# Patient Record
Sex: FEMALE | Race: WHITE | NOT HISPANIC OR LATINO | Employment: UNEMPLOYED | URBAN - METROPOLITAN AREA
[De-identification: names, ages, dates, MRNs, and addresses within clinical notes are randomized per-mention and may not be internally consistent; named-entity substitution may affect disease eponyms.]

---
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
## 2024-07-26 ENCOUNTER — INITIAL PRENATAL (OUTPATIENT)
Dept: OBSTETRICS AND GYNECOLOGY | Facility: CLINIC | Age: 19
End: 2024-07-26
Payer: COMMERCIAL

## 2024-07-26 VITALS — DIASTOLIC BLOOD PRESSURE: 62 MMHG | SYSTOLIC BLOOD PRESSURE: 108 MMHG | WEIGHT: 196.8 LBS

## 2024-07-26 DIAGNOSIS — Z98.891 PREVIOUS CESAREAN SECTION: ICD-10-CM

## 2024-07-26 DIAGNOSIS — Z36.9 ENCOUNTER FOR ANTENATAL SCREENING: Primary | ICD-10-CM

## 2024-07-26 DIAGNOSIS — Z34.80 SUPERVISION OF OTHER NORMAL PREGNANCY, ANTEPARTUM: ICD-10-CM

## 2024-07-26 PROBLEM — A60.04 HERPES SIMPLEX VULVOVAGINITIS: Status: ACTIVE | Noted: 2024-07-26

## 2024-07-26 RX ORDER — PYRIDOXINE HCL (VITAMIN B6) 50 MG
25 TABLET ORAL DAILY
COMMUNITY
Start: 2024-07-08 | End: 2024-08-07

## 2024-07-26 RX ORDER — ONDANSETRON 4 MG/1
4 TABLET, ORALLY DISINTEGRATING ORAL EVERY 6 HOURS PRN
COMMUNITY
Start: 2024-07-08 | End: 2024-08-07

## 2024-07-26 RX ORDER — BUTALBITAL, ACETAMINOPHEN AND CAFFEINE 300; 40; 50 MG/1; MG/1; MG/1
1 CAPSULE ORAL EVERY 4 HOURS PRN
Qty: 84 CAPSULE | Refills: 1 | Status: CANCELLED | OUTPATIENT
Start: 2024-07-26

## 2024-07-26 RX ORDER — DOXYLAMINE SUCCINATE AND PYRIDOXINE HYDROCHLORIDE, DELAYED RELEASE TABLETS 10 MG/10 MG 10; 10 MG/1; MG/1
TABLET, DELAYED RELEASE ORAL
COMMUNITY
Start: 2024-07-08

## 2024-07-26 RX ORDER — PROMETHAZINE HYDROCHLORIDE 12.5 MG/1
12.5 TABLET ORAL EVERY 6 HOURS PRN
Qty: 30 TABLET | Refills: 3 | Status: SHIPPED | OUTPATIENT
Start: 2024-07-26

## 2024-07-26 NOTE — PROGRESS NOTES
Initial ob visit     CC- Here for care of pregnancy        Hannah Collett is a 18 y.o. female, , who presents for her first obstetrical visit. Patient's last menstrual period was 2024. Her SOTERO is 2025, by Ultrasound. Current GA is 13w4d.     Initial positive test date : 2024, UPT        Her periods are every 27 days.  Prior obstetric issues: HSV+ antibodies and Previous C/S  Patient's past medical history is significant for:  HSV antibodies (never had an outbreak)  Family history of genetic issues (includes FOB): No  Prior infections concerning in pregnancy (Rash, fever in last 2 weeks): No  Varicella Hx - unknown   Prior testing for Cystic Fibrosis Carrier or Sickle Cell Trait- No  Prepregnancy BMI - There is no height or weight on file to calculate BMI.  History of STD: no  Hx of HSV for patient or partner: Patient with HSV+ antibodies  Ultrasound Today: Yes. SIUP measuring 13w4d with FHR: 157bpm.     OB History    Para Term  AB Living   2 1 1 0 0 1   SAB IAB Ectopic Molar Multiple Live Births   0 0 0 0 0 1      # Outcome Date GA Lbr Antione/2nd Weight Sex Type Anes PTL Lv   2 Current            1 Term 21 39w0d  3827 g (8 lb 7 oz) M CS-LTranv Spinal  ARUNA      Birth Comments: [facility]      Obstetric Comments   FOB 1 for G1 and G2       Additional Pertinent History   Last Pap : Patient has never had a pap smear.   Last Completed Pap Smear       This patient has no relevant Health Maintenance data.          History of abnormal Pap smear:  N/A  Family history of uterine, colon, breast, or ovarian cancer: yes - MGM- breast and ovarian CA  Feelings of Anxiety or Depression: no  Tobacco Usage?: Yes Hannah Collett  reports that she has been smoking. She does not have any smokeless tobacco history on file. I have educated her on the risk of diseases from using tobacco products such as cancer, COPD, heart disease, reproductive problems, and low birth weight.      I advised her to quit and she is not willing to quit.    I spent 3  minutes counseling the patient.        Alcohol/Drug Use?: YES Drug: Marijuana- last use was last week  Over the age of 35 at delivery: no  Genetic Screening: desires cell free DNA    PMH    Current Outpatient Medications:     doxylamine (UNISOM) 25 MG tablet, Take 1 tablet by mouth., Disp: , Rfl:     doxylamine-pyridoxine (DICLEGIS) 10-10 MG tablet delayed-release EC tablet, Two tablets at bedtime on days 1 and 2; if symptoms persist, take 1 tablet in morning and 2 tablets at bedtime on day 3; if symptoms persist follow up with OB provider (Patient not taking: Reported on 2024), Disp: , Rfl:     ondansetron ODT (ZOFRAN-ODT) 4 MG disintegrating tablet, Take 1 tablet by mouth Every 6 (Six) Hours As Needed. (Patient not taking: Reported on 2024), Disp: , Rfl:     Prenatal Vit-Fe Fumarate-FA (prenatal vitamin 28-0.8) 28-0.8 MG tablet tablet, Daily. (Patient not taking: Reported on 2024), Disp: , Rfl:     promethazine (PHENERGAN) 12.5 MG tablet, Take 1 tablet by mouth Every 6 (Six) Hours As Needed for Nausea., Disp: 30 tablet, Rfl: 3    pyridoxine 50 MG tablet, Take 0.5 tablets by mouth Daily. (Patient not taking: Reported on 2024), Disp: , Rfl:      Past Medical History:   Diagnosis Date    HSV-2 infection     Ovarian cyst         Past Surgical History:   Procedure Laterality Date     SECTION         Review of Systems   Review of Systems    Patient Reports:  Nausea and Vomiting. Patient reports vomiting every AM. Patient is taking Vitamin B6. Patient was given prescription for Diclegis and Zofran at  [facility] earlier this month. Patient also reports headaches that are not relieved by Tylenol and rest.    Patient Denies:vaginal bleeding  All systems reviewed and otherwise normal.    I have reviewed and agree with the HPI, ROS, and historical information as entered above. Hollis Henning MD      /62   Wt  89.3 kg (196 lb 12.8 oz)   LMP 2024     The additional following portions of the patient's history were reviewed and updated as appropriate: allergies, current medications, past family history, past medical history, past social history, past surgical history, and problem list.    Physical Exam  General:  well developed; well nourished  no acute distress   Chest/Respiratory: No labored breathing, normal respiratory effort, normal appearance, no respiratory noises noted   Heart:  normal rate, regular rhythm,  no murmurs, rubs, or gallops   Thyroid: normal to inspection and palpation   Breasts:  Not performed.   Abdomen: soft, non-tender; no masses  no umbilical or inguinal hernias are present  no hepato-splenomegaly   Pelvis: Not performed.        Assessment and Plan    Problem List Items Addressed This Visit       Previous  section    Supervision of other normal pregnancy, antepartum     Other Visit Diagnoses       Encounter for  screening    -  Primary    Relevant Orders    Obstetric Panel    HIV-1 / O / 2 Ag / Antibody    Urine Culture - Urine, Urine, Clean Catch    Urinalysis With Microscopic - Urine, Clean Catch    Chlamydia trachomatis, Neisseria gonorrhoeae, PCR - Urine, Urine, Clean Catch    Urine Drug Screen - Urine, Clean Catch    Hemoglobin A1c    TSH    Varicella Zoster Antibody, IgG    [id number] PLUS Core+SCA+ESS - Blood,    HSV 1 & 2 - Specific Antibody, IgG            Pregnancy at 13w4d  Reviewed routine prenatal care with the office and educational materials given  Lab(s) Ordered  Discussed options for genetic testing including first trimester nuchal translucency screen, genetic disease carrier testing, quadruple screen, and NIPT  Nausea/Vomiting - desires medication.  Options discussed and encouraged to be proactive to avoid constipation if on Zofran.  Patient is on Prenatal vitamins  Return in about 4 weeks (around 2024) for Routine prenatal care.      Hollis Mederos  MD Milady  07/26/2024

## 2024-07-27 LAB
ABO GROUP BLD: ABNORMAL
AMPHETAMINES UR QL SCN: NEGATIVE NG/ML
APPEARANCE UR: ABNORMAL
BACTERIA #/AREA URNS HPF: NORMAL /[HPF]
BARBITURATES UR QL SCN: NEGATIVE NG/ML
BASOPHILS # BLD AUTO: 0 X10E3/UL (ref 0–0.2)
BASOPHILS NFR BLD AUTO: 0 %
BENZODIAZ UR QL SCN: NEGATIVE NG/ML
BILIRUB UR QL STRIP: NEGATIVE
BLD GP AB SCN SERPL QL: NEGATIVE
BZE UR QL SCN: NEGATIVE NG/ML
CANNABINOIDS UR QL SCN: POSITIVE NG/ML
CASTS URNS QL MICRO: NORMAL /LPF
COLOR UR: YELLOW
CREAT UR-MCNC: 158 MG/DL (ref 20–300)
EOSINOPHIL # BLD AUTO: 0.1 X10E3/UL (ref 0–0.4)
EOSINOPHIL NFR BLD AUTO: 1 %
EPI CELLS #/AREA URNS HPF: NORMAL /HPF (ref 0–10)
ERYTHROCYTE [DISTWIDTH] IN BLOOD BY AUTOMATED COUNT: 12.3 % (ref 11.7–15.4)
GLUCOSE UR QL STRIP: NEGATIVE
HBA1C MFR BLD: 5 % (ref 4.8–5.6)
HBV SURFACE AG SERPL QL IA: NEGATIVE
HCT VFR BLD AUTO: 42.3 % (ref 34–46.6)
HCV IGG SERPL QL IA: NON REACTIVE
HGB BLD-MCNC: 13.8 G/DL (ref 11.1–15.9)
HGB UR QL STRIP: NEGATIVE
HIV 1+2 AB+HIV1 P24 AG SERPL QL IA: NON REACTIVE
HSV1 IGG SER IA-ACNC: 31.3 INDEX (ref 0–0.9)
HSV2 IGG SER IA-ACNC: 5.45 INDEX (ref 0–0.9)
IMM GRANULOCYTES # BLD AUTO: 0 X10E3/UL (ref 0–0.1)
IMM GRANULOCYTES NFR BLD AUTO: 0 %
KETONES UR QL STRIP: NEGATIVE
LABORATORY COMMENT REPORT: ABNORMAL
LEUKOCYTE ESTERASE UR QL STRIP: NEGATIVE
LYMPHOCYTES # BLD AUTO: 2.9 X10E3/UL (ref 0.7–3.1)
LYMPHOCYTES NFR BLD AUTO: 25 %
MCH RBC QN AUTO: 30.1 PG (ref 26.6–33)
MCHC RBC AUTO-ENTMCNC: 32.6 G/DL (ref 31.5–35.7)
MCV RBC AUTO: 92 FL (ref 79–97)
METHADONE UR QL SCN: NEGATIVE NG/ML
MICRO URNS: ABNORMAL
MICRO URNS: ABNORMAL
MONOCYTES # BLD AUTO: 0.7 X10E3/UL (ref 0.1–0.9)
MONOCYTES NFR BLD AUTO: 6 %
NEUTROPHILS # BLD AUTO: 7.8 X10E3/UL (ref 1.4–7)
NEUTROPHILS NFR BLD AUTO: 68 %
NITRITE UR QL STRIP: NEGATIVE
OPIATES UR QL SCN: NEGATIVE NG/ML
OXYCODONE+OXYMORPHONE UR QL SCN: NEGATIVE NG/ML
PCP UR QL: NEGATIVE NG/ML
PH UR STRIP: 8.5 [PH] (ref 5–7.5)
PH UR: 8.8 [PH] (ref 4.5–8.9)
PLATELET # BLD AUTO: 328 X10E3/UL (ref 150–450)
PROPOXYPH UR QL SCN: NEGATIVE NG/ML
PROT UR QL STRIP: ABNORMAL
RBC # BLD AUTO: 4.58 X10E6/UL (ref 3.77–5.28)
RBC #/AREA URNS HPF: NORMAL /HPF (ref 0–2)
RH BLD: POSITIVE
RPR SER QL: NON REACTIVE
RUBV IGG SERPL IA-ACNC: 1.14 INDEX
SP GR UR STRIP: 1.02 (ref 1–1.03)
TSH SERPL DL<=0.005 MIU/L-ACNC: 0.79 UIU/ML (ref 0.45–4.5)
UROBILINOGEN UR STRIP-MCNC: 0.2 MG/DL (ref 0.2–1)
VZV IGG SER IA-ACNC: 239 INDEX
WBC # BLD AUTO: 11.6 X10E3/UL (ref 3.4–10.8)
WBC #/AREA URNS HPF: NORMAL /HPF (ref 0–5)

## 2024-07-28 LAB
BACTERIA UR CULT: NORMAL
BACTERIA UR CULT: NORMAL

## 2024-07-29 LAB
C TRACH RRNA SPEC QL NAA+PROBE: NEGATIVE
N GONORRHOEA RRNA SPEC QL NAA+PROBE: NEGATIVE

## 2024-08-29 ENCOUNTER — ROUTINE PRENATAL (OUTPATIENT)
Dept: OBSTETRICS AND GYNECOLOGY | Facility: CLINIC | Age: 19
End: 2024-08-29
Payer: COMMERCIAL

## 2024-08-29 VITALS — WEIGHT: 198 LBS | SYSTOLIC BLOOD PRESSURE: 118 MMHG | DIASTOLIC BLOOD PRESSURE: 76 MMHG

## 2024-08-29 DIAGNOSIS — Z34.90 PRENATAL CARE, ANTEPARTUM: Primary | ICD-10-CM

## 2024-08-29 DIAGNOSIS — Z3A.18 18 WEEKS GESTATION OF PREGNANCY: ICD-10-CM

## 2024-08-29 DIAGNOSIS — Z34.80 INTRAUTERINE PREGNANCY IN TEENAGER: ICD-10-CM

## 2024-08-29 DIAGNOSIS — Z34.80 SUPERVISION OF OTHER NORMAL PREGNANCY, ANTEPARTUM: ICD-10-CM

## 2024-08-29 LAB
GLUCOSE UR STRIP-MCNC: NEGATIVE MG/DL
PROT UR STRIP-MCNC: NEGATIVE MG/DL

## 2024-08-29 NOTE — PROGRESS NOTES
OB FOLLOW UP  CC- Here for care of pregnancy        Hannah Collett is a 18 y.o.  18w3d patient being seen today for her obstetrical follow up visit. Patient reports no complaints    Her prenatal care is complicated by (and status) : see below.  Patient Active Problem List   Diagnosis    Pregnancy    Herpes simplex vulvovaginitis    Previous  section    Supervision of other normal pregnancy, antepartum    Intrauterine pregnancy in teenager       Flu Status: Declines  Ultrasound Today: No    AFP: is undecided about    ROS -   Patient Denies: leaking of fluid, vaginal bleeding, dysuria, excessive vomiting, and more than 6 contractions per hour  All other systems reviewed and are negative.       The additional following portions of the patient's history were reviewed and updated as appropriate: allergies, current medications, past medical history, past social history, past surgical history, and problem list.      I have reviewed and agree with the HPI, ROS, and historical information as entered above. Ritika Eason, APRN          EXAM:     Prenatal Vitals  BP: 118/76  Weight: 89.8 kg (198 lb)   Fetal Heart Rate: +         Urine Glucose Read-only: Negative  Urine Protein Read-only: Negative           Assessment and Plan    Problem List Items Addressed This Visit          Gravid and     Pregnancy    Supervision of other normal pregnancy, antepartum    Intrauterine pregnancy in teenager     Other Visit Diagnoses       Prenatal care, antepartum    -  Primary    Relevant Orders    POC Urinalysis Dipstick (Completed)    US Ob 14 + Weeks Single or First Gestation            Pregnancy at 18w3d  Fetal status reassuring.   Counseled on MSAFP alone in relation to OTD and placental issues.    Anatomy scan next visit.   Activity and Exercise discussed.  Patient is on Prenatal vitamins  Return in about 2 weeks (around 2024) for anatomy US.    Ritika Eason, APRN  2024

## 2024-09-11 ENCOUNTER — REFERRAL TRIAGE (OUTPATIENT)
Dept: LABOR AND DELIVERY | Facility: HOSPITAL | Age: 19
End: 2024-09-11
Payer: COMMERCIAL

## 2024-09-19 ENCOUNTER — PATIENT OUTREACH (OUTPATIENT)
Dept: LABOR AND DELIVERY | Facility: HOSPITAL | Age: 19
End: 2024-09-19
Payer: COMMERCIAL

## 2024-10-01 ENCOUNTER — ROUTINE PRENATAL (OUTPATIENT)
Dept: OBSTETRICS AND GYNECOLOGY | Facility: CLINIC | Age: 19
End: 2024-10-01
Payer: COMMERCIAL

## 2024-10-01 VITALS — WEIGHT: 214.4 LBS | SYSTOLIC BLOOD PRESSURE: 116 MMHG | DIASTOLIC BLOOD PRESSURE: 74 MMHG

## 2024-10-01 DIAGNOSIS — Z3A.23 23 WEEKS GESTATION OF PREGNANCY: Primary | ICD-10-CM

## 2024-10-01 DIAGNOSIS — Z98.891 PREVIOUS CESAREAN SECTION: ICD-10-CM

## 2024-10-01 PROCEDURE — 99213 OFFICE O/P EST LOW 20 MIN: CPT | Performed by: OBSTETRICS & GYNECOLOGY

## 2024-10-01 NOTE — PROGRESS NOTES
OB FOLLOW UP  CC- Here for care of pregnancy        Hannah Collett is a 19 y.o.  23w1d patient being seen today for her obstetrical follow up visit. Patient reports no complaints.     Her prenatal care is complicated by (and status) : see below.  Patient Active Problem List   Diagnosis    Pregnancy    Herpes simplex vulvovaginitis    Previous  section    Supervision of other normal pregnancy, antepartum    Intrauterine pregnancy in teenager       Ultrasound Today: Yes  Reviewed 1 hr glucose testing and TDAP next visit.    ROS -   Patient Denies: leaking of fluid, vaginal bleeding, dysuria, excessive vomiting, and more than 6 contractions per hour  Fetal Movement : normal  All other systems reviewed and are negative.       The additional following portions of the patient's history were reviewed and updated as appropriate: allergies, current medications, past family history, past medical history, past social history, past surgical history, and problem list.      I have reviewed and agree with the HPI, ROS, and historical information as entered above. Me      /74   Wt 97.3 kg (214 lb 6.4 oz)   LMP 2024       EXAM:     Prenatal Vitals  BP: 116/74  Weight: 97.3 kg (214 lb 6.4 oz)   Fetal Heart Rate: 154/US                       Assessment and Plan    Problem List Items Addressed This Visit       Pregnancy - Primary    Relevant Orders    [facility]    Previous  section       Pregnancy at 23w1d  Fetal status reassuring.  anatomy scan incomplete.  1 hour gtt, CBC, Antibody screen, TDAP, and RPR next visit. Instructions given  Fetal movement/PTL or Labor precautions  Reviewed routine prenatal care with the office and educational materials given  Referral to [facility] Ordered  Discussed/encouraged TDAP vaccination after 28 weeks  Reviewed Pre-eclampsia signs/symptoms  Activity and Exercise discussed.  Return in about 4 weeks (around 10/29/2024) for Routine prenatal  care after [facility] scan. [facility] referral placed to complete anatomy scan.      Hollis Henning MD  10/01/2024

## 2024-10-29 ENCOUNTER — HOSPITAL ENCOUNTER (OUTPATIENT)
Dept: WOMENS IMAGING | Facility: HOSPITAL | Age: 19
Discharge: HOME | End: 2024-10-29
Admitting: OBSTETRICS & GYNECOLOGY
Payer: COMMERCIAL

## 2024-10-29 ENCOUNTER — ROUTINE PRENATAL (OUTPATIENT)
Dept: OBSTETRICS AND GYNECOLOGY | Facility: CLINIC | Age: 19
End: 2024-10-29
Payer: COMMERCIAL

## 2024-10-29 ENCOUNTER — OFFICE VISIT (OUTPATIENT)
Dept: OBSTETRICS AND GYNECOLOGY | Facility: HOSPITAL | Age: 19
End: 2024-10-29
Payer: COMMERCIAL

## 2024-10-29 VITALS — DIASTOLIC BLOOD PRESSURE: 71 MMHG | SYSTOLIC BLOOD PRESSURE: 122 MMHG | WEIGHT: 219 LBS

## 2024-10-29 VITALS — SYSTOLIC BLOOD PRESSURE: 122 MMHG | WEIGHT: 220.6 LBS | DIASTOLIC BLOOD PRESSURE: 82 MMHG

## 2024-10-29 DIAGNOSIS — Z3A.27 27 WEEKS GESTATION OF PREGNANCY: Primary | ICD-10-CM

## 2024-10-29 DIAGNOSIS — Z98.891 PREVIOUS CESAREAN SECTION: Primary | ICD-10-CM

## 2024-10-29 DIAGNOSIS — Z3A.23 23 WEEKS GESTATION OF PREGNANCY: ICD-10-CM

## 2024-10-29 DIAGNOSIS — Z98.891 PREVIOUS CESAREAN SECTION: ICD-10-CM

## 2024-10-29 DIAGNOSIS — F17.200 SMOKER: ICD-10-CM

## 2024-10-29 DIAGNOSIS — Z3A.27 27 WEEKS GESTATION OF PREGNANCY: ICD-10-CM

## 2024-10-29 PROBLEM — B00.1 FEVER BLISTER: Status: ACTIVE | Noted: 2024-07-26

## 2024-10-29 PROCEDURE — 76811 OB US DETAILED SNGL FETUS: CPT

## 2024-10-29 NOTE — PROGRESS NOTES
Patient denies any leaking fluid, bleeding, or contractions. Patient states having severe back pain, rating a 10 on pain scale, occurring occasionally and pain lasts 5 minutes.   NIPT negative  Patient states follow up with Dr. Henning's office is today.

## 2024-10-29 NOTE — PROGRESS NOTES
OB FOLLOW UP  CC- Here for care of pregnancy        Hannah Collett is a 19 y.o.  27w1d patient being seen today for her obstetrical follow up. Patient reports no complaints.     Patient undergoing Glucola testing today. She is due for her testing at 12:00.       MBT: O+  Rhogam: is not indicated.  28 week packet: reviewed with patient , counseled on fetal movement , pediatrician list reviewed, breast pump discussed, and childbirth classes reviewed  TDAP: declines  Flu Status: Declines  Ultrasound Today: Yes, at [facility]     Her prenatal care is complicated by (and status) : see below.  Patient Active Problem List   Diagnosis    Pregnancy    Fever blister    Previous  section    Supervision of other normal pregnancy, antepartum    Intrauterine pregnancy in teenager    Smoker         ROS -   Patient Denies: Loss of Fluid, Vaginal Spotting, Vision Changes, Headaches, Nausea , Vomiting , Contractions, Epigastric pain, and skin itching  Fetal Movement : normal    The additional following portions of the patient's history were reviewed and updated as appropriate: allergies, current medications, past family history, past medical history, past social history, past surgical history, and problem list.    I have reviewed and agree with the HPI, ROS, and historical information as entered above. Hollis Henning MD      /82   Wt 100 kg (220 lb 9.6 oz)   LMP 2024         EXAM:     Prenatal Vitals  BP: 122/82  Weight: 100 kg (220 lb 9.6 oz)                             Assessment and Plan    Problem List Items Addressed This Visit       Pregnancy - Primary    Relevant Orders    CBC (No Diff)    Gestational Screen 1 Hr ([facility])    Antibody Screen    RPR, Rfx Qn RPR / Confirm TP    Previous  section       Pregnancy at 27w1d  1 hr Glucola, CBC, RPR. Antibody screen and TDAP today  Fetal movement/PTL or Labor precautions  Lab(s) Ordered  Patient is on Prenatal vitamins  Reviewed Pre-eclampsia  signs/symptoms  Activity and Exercise discussed.  Return in about 2 weeks (around 11/12/2024) for Routine prenatal care.        Hollis Henning MD  10/29/2024

## 2024-10-29 NOTE — PROGRESS NOTES
Patient seen in Maternal Fetal Medicine clinic today. Please see full note in under imaging tab of patient chart in Epic (Viewpoint report).    Hannah Kahn MD

## 2024-10-30 LAB
BLD GP AB SCN SERPL QL: NEGATIVE
ERYTHROCYTE [DISTWIDTH] IN BLOOD BY AUTOMATED COUNT: 12 % (ref 12.3–15.4)
GLUCOSE 1H P 50 G GLC PO SERPL-MCNC: 109 MG/DL (ref 65–139)
HCT VFR BLD AUTO: 37.5 % (ref 34–46.6)
HGB BLD-MCNC: 12.3 G/DL (ref 12–15.9)
MCH RBC QN AUTO: 30.4 PG (ref 26.6–33)
MCHC RBC AUTO-ENTMCNC: 32.8 G/DL (ref 31.5–35.7)
MCV RBC AUTO: 92.6 FL (ref 79–97)
PLATELET # BLD AUTO: 347 10*3/MM3 (ref 140–450)
RBC # BLD AUTO: 4.05 10*6/MM3 (ref 3.77–5.28)
RPR SER QL: NON REACTIVE
WBC # BLD AUTO: 18.43 10*3/MM3 (ref 3.4–10.8)

## 2024-11-07 ENCOUNTER — PATIENT OUTREACH (OUTPATIENT)
Dept: LABOR AND DELIVERY | Facility: HOSPITAL | Age: 19
End: 2024-11-07
Payer: COMMERCIAL

## 2024-11-07 NOTE — OUTREACH NOTE
[facility]  Unable to Reach    Questions/Answers      Flowsheet Row Responses   Pending Outreach Prenatal Check-in   Call Attempt First   Outcome Missing or invalid number   Unable to reach comments: Calls not going through              CHARLIE Ramírez RN  Maternity Nurse Navigator    11/7/2024, 11:40 EST

## 2024-11-11 ENCOUNTER — PATIENT OUTREACH (OUTPATIENT)
Dept: LABOR AND DELIVERY | Facility: HOSPITAL | Age: 19
End: 2024-11-11
Payer: COMMERCIAL

## 2024-11-11 NOTE — OUTREACH NOTE
[facility]  Unable to Reach    Questions/Answers      Flowsheet Row Responses   Pending Outreach Prenatal Check-in   Call Attempt Second   Outcome Missing or invalid number   Next Call Attempt Date 11/12/24   Unable to reach comments: Call not going through. Will see in office tomorrow.            CHARLIE Ramírez RN  Maternity Nurse Navigator    11/11/2024, 13:26 EST

## 2024-11-12 ENCOUNTER — TELEPHONE (OUTPATIENT)
Dept: OBSTETRICS AND GYNECOLOGY | Facility: CLINIC | Age: 19
End: 2024-11-12

## 2024-11-12 NOTE — TELEPHONE ENCOUNTER
Caller: Collett, Hannah    Relationship:  Self    Best call back number: [phone]    PATIENT CALLED REQUESTING TO CANCEL SAME DAY APPT.    Did the patient call AFTER the start time of their scheduled appointment?  []YES  [x]NO    Was the patient's appointment rescheduled? [x]YES  []NO    Any additional information:

## 2024-11-13 ENCOUNTER — ROUTINE PRENATAL (OUTPATIENT)
Dept: OBSTETRICS AND GYNECOLOGY | Facility: CLINIC | Age: 19
End: 2024-11-13
Payer: COMMERCIAL

## 2024-11-13 ENCOUNTER — PATIENT OUTREACH (OUTPATIENT)
Dept: LABOR AND DELIVERY | Facility: HOSPITAL | Age: 19
End: 2024-11-13
Payer: COMMERCIAL

## 2024-11-13 VITALS — DIASTOLIC BLOOD PRESSURE: 78 MMHG | SYSTOLIC BLOOD PRESSURE: 118 MMHG | WEIGHT: 221.6 LBS

## 2024-11-13 DIAGNOSIS — Z98.891 PREVIOUS CESAREAN SECTION: ICD-10-CM

## 2024-11-13 DIAGNOSIS — Z3A.29 29 WEEKS GESTATION OF PREGNANCY: Primary | ICD-10-CM

## 2024-11-13 LAB
GLUCOSE UR STRIP-MCNC: NEGATIVE MG/DL
PROT UR STRIP-MCNC: NEGATIVE MG/DL

## 2024-11-13 NOTE — OUTREACH NOTE
"[facility]  Check-In    Current Estimated Gestational Age: 29w2d      Questions/Answers      Flowsheet Row Responses   Best Method for Contacting emere  [currently out of minutes on phone until beginning of month]   Demographics Reviewed Yes   Currently Employed No   Able to keep appointments as scheduled Yes   Gender(s) and Name(s) Girl \"Gail\"   Baby Active/Feeling Fetal Movemen Yes   How are you presently feeling? Good   Are you having any of the following symptoms? --  [Denies]   Questions regarding prenatal visits or tests to be ordered? No   Education related to new diagnoses/home equipment No   Supplies ready for baby Clothing, Crib   Resource/Environmental Concerns Financial, Reliable Transportation   Do you have any questions related to your care experience, your pregnancy, plans for delivery, any concerns, etc? No   Other Education WIC Benefits, Birth Plan, Other   Other Education Comment Fetal Kick Counts            Feeling well and reports good fetal movement. Denies any concerning symptoms but discussed what to watch out for. States she has an appointment for WIC today. Reviewed fetal kick counts and provided with printed version of Count the Kicks for tracking purposes. Discussed breastfeeding video and outpatient lactation clinic support as well as WIC support. She has begun gathering items she will need for baby.    Updated resources provided via emere message. Contact information provided. Encouraged to call with questions, concerns, or for support. Will plan f/u around 35 weeks to ensure she has everything she needs in place and feels ready for delivery.    CHARLIE Ramírez RN  Maternity Nurse Navigator    11/13/2024, 11:40 EST          "

## 2024-11-13 NOTE — PROGRESS NOTES
OB FOLLOW UP  CC- Here for care of pregnancy        Hannah Collett is a 19 y.o.  29w2d patient being seen today for her obstetrical follow up visit. Patient reports lower back pain that is intermittent.     Her prenatal care is complicated by (and status) :  see below.  Patient Active Problem List   Diagnosis    Pregnancy    Fever blister    Previous  section    Supervision of other normal pregnancy, antepartum    Intrauterine pregnancy in teenager    Smoker       Flu Status: Declines  TDAP status: declines  Rhogam status: was not indicated  28 week labs: Reviewed and normal  Ultrasound Today: No  Non Stress Test: No.      ROS -   Patient Denies: Loss of Fluid, Vaginal Spotting, Vision Changes, Headaches, Nausea , Vomiting , Contractions, Epigastric pain, and skin itching  Fetal Movement : normal  All other systems reviewed and are negative.       The additional following portions of the patient's history were reviewed and updated as appropriate: allergies, current medications, past family history, past medical history, past social history, past surgical history, and problem list.    I have reviewed and agree with the HPI, ROS, and historical information as entered above. Hollis Henning MD      /78   Wt 101 kg (221 lb 9.6 oz)   LMP 2024         EXAM:     Prenatal Vitals  BP: 118/78  Weight: 101 kg (221 lb 9.6 oz)                   Urine Glucose Read-only: Negative  Urine Protein Read-only: Negative           Assessment and Plan    Problem List Items Addressed This Visit       Pregnancy - Primary    Relevant Orders    POC Urinalysis Dipstick (Completed)    Previous  section     Other Visit Diagnoses       Maternal care for breech presentation, single gestation                Pregnancy at 29w2d  Fetal status reassuring.  28 week labs reviewed.    Activity and Exercise discussed.  Fetal movement/PTL or Labor precautions  Patient is on Prenatal vitamins  Reviewed  Pre-eclampsia signs/symptoms  Return in about 2 weeks (around 11/27/2024) for Routine prenatal care.    Hollis Henning MD  11/13/2024

## 2024-12-04 ENCOUNTER — ROUTINE PRENATAL (OUTPATIENT)
Dept: OBSTETRICS AND GYNECOLOGY | Facility: CLINIC | Age: 19
End: 2024-12-04
Payer: COMMERCIAL

## 2024-12-04 VITALS — SYSTOLIC BLOOD PRESSURE: 110 MMHG | DIASTOLIC BLOOD PRESSURE: 68 MMHG | WEIGHT: 230 LBS

## 2024-12-04 DIAGNOSIS — A60.00 GENITAL HERPES SIMPLEX, UNSPECIFIED SITE: ICD-10-CM

## 2024-12-04 DIAGNOSIS — Z34.83 MULTIGRAVIDA IN THIRD TRIMESTER: Primary | ICD-10-CM

## 2024-12-04 DIAGNOSIS — Z98.891 PREVIOUS CESAREAN SECTION: ICD-10-CM

## 2024-12-04 LAB
GLUCOSE UR STRIP-MCNC: NEGATIVE MG/DL
PROT UR STRIP-MCNC: NEGATIVE MG/DL

## 2024-12-04 RX ORDER — VALACYCLOVIR HYDROCHLORIDE 1 G/1
1000 TABLET, FILM COATED ORAL DAILY
Qty: 30 TABLET | Refills: 12 | Status: SHIPPED | OUTPATIENT
Start: 2024-12-04

## 2024-12-04 NOTE — PROGRESS NOTES
OB FOLLOW UP  CC- Here for care of pregnancy        Hannah Collett is a 19 y.o.  32w2d patient being seen today for her obstetrical follow up visit. Patient reports skin itching. She reports itching in feet since last night. Also reports occasional BHCTX and increased vaginal pressure.     Her prenatal care is complicated by (and status) :  see below.  Patient Active Problem List   Diagnosis    Pregnancy    Fever blister    Previous  section    Supervision of other normal pregnancy, antepartum    Intrauterine pregnancy in teenager    Smoker    Genital herpes       Flu Status: Declines  TDAP status: declines  RSV vaccine: given today.  Rhogam status: was not indicated  28 week labs: Reviewed and normal  Ultrasound Today: No  Non Stress Test: No.      ROS -   Patient Denies: Loss of Fluid, Vaginal Spotting, Vision Changes, Headaches, Nausea , Vomiting , and Epigastric pain  Fetal Movement : normal  All other systems reviewed and are negative.       The additional following portions of the patient's history were reviewed and updated as appropriate: allergies, current medications, and problem list.    I have reviewed and agree with the HPI, ROS, and historical information as entered above. Hollis Henning MD      /68   Wt 104 kg (230 lb)   LMP 2024         EXAM:     Prenatal Vitals  BP: 110/68  Weight: 104 kg (230 lb)                   Urine Glucose Read-only: Negative  Urine Protein Read-only: Negative           Assessment and Plan    Problem List Items Addressed This Visit       Previous  section    Genital herpes    Relevant Medications    valACYclovir (Valtrex) 1000 MG tablet     Other Visit Diagnoses       Multigravida in third trimester    -  Primary    Relevant Orders    POC Urinalysis Dipstick (Completed)    ABRYSVO RSV Vaccine (Adults 60+, pregnant women 32-36 wks) (Completed)            Pregnancy at 32w2d  Fetal status reassuring.  28 week labs reviewed.     Activity and Exercise discussed.  Fetal movement/PTL or Labor precautions  Medication(s) Ordered  Patient is on Prenatal vitamins  Reviewed Pre-eclampsia signs/symptoms  Will start on Valacyclovir for HSV suppression  Return in about 2 weeks (around 12/18/2024) for Routine prenatal care.    Hollis Henning MD  12/04/2024

## 2024-12-23 ENCOUNTER — ROUTINE PRENATAL (OUTPATIENT)
Dept: OBSTETRICS AND GYNECOLOGY | Facility: CLINIC | Age: 19
End: 2024-12-23
Payer: COMMERCIAL

## 2024-12-23 ENCOUNTER — PATIENT OUTREACH (OUTPATIENT)
Dept: LABOR AND DELIVERY | Facility: HOSPITAL | Age: 19
End: 2024-12-23
Payer: COMMERCIAL

## 2024-12-23 VITALS — SYSTOLIC BLOOD PRESSURE: 108 MMHG | DIASTOLIC BLOOD PRESSURE: 78 MMHG | WEIGHT: 233 LBS

## 2024-12-23 DIAGNOSIS — O98.819 CHLAMYDIA INFECTION DURING PREGNANCY: ICD-10-CM

## 2024-12-23 DIAGNOSIS — A74.9 CHLAMYDIA INFECTION DURING PREGNANCY: ICD-10-CM

## 2024-12-23 DIAGNOSIS — Z3A.35 35 WEEKS GESTATION OF PREGNANCY: Primary | ICD-10-CM

## 2024-12-23 LAB
GLUCOSE UR STRIP-MCNC: NEGATIVE MG/DL
PROT UR STRIP-MCNC: NEGATIVE MG/DL

## 2024-12-23 RX ORDER — AZITHROMYCIN 500 MG/1
1000 TABLET, FILM COATED ORAL ONCE
Qty: 2 TABLET | Refills: 0 | Status: SHIPPED | OUTPATIENT
Start: 2024-12-23 | End: 2024-12-23

## 2024-12-23 NOTE — OUTREACH NOTE
[facility]  Unable to Reach    Questions/Answers      Flowsheet Row Responses   Pending Outreach Prenatal Check-in   Call Attempt First   Outcome No answer/busy   Next Call Attempt Date 01/06/25              CHARLIE Ramírez RN  Maternity Nurse Navigator    12/23/2024, 13:10 EST

## 2024-12-23 NOTE — PROGRESS NOTES
OB FOLLOW UP  CC- Here for care of pregnancy        Hannah Collett is a 19 y.o.  35w0d patient being seen today for her obstetrical follow up visit. Patient reports positive chlamydia at  2024.   Discussed treatment and will send in Rx.   Also discussed repeat CS and bilateral salpingectomy. Risks of surgery explained and all  questions answered.     Her prenatal care is complicated by (and status) : see below.  Patient Active Problem List   Diagnosis    Pregnancy    Fever blister    Previous  section    Supervision of other normal pregnancy, antepartum    Intrauterine pregnancy in teenager    Smoker    Genital herpes       Flu Status: Declines  RSV: already given  Ultrasound Today: No  Non Stress Test: No.    ROS -   Patient Denies: Loss of Fluid, Vaginal Spotting, Vision Changes, Headaches, Nausea , Vomiting , Epigastric pain, and skin itching  Fetal Movement : normal  All other systems reviewed and are negative.       The additional following portions of the patient's history were reviewed and updated as appropriate: allergies and current medications.    I have reviewed and agree with the HPI, ROS, and historical information as entered above. Hollis Henning MD      /78   Wt 106 kg (233 lb)   LMP 2024       EXAM:     Prenatal Vitals  BP: 108/78  Weight: 106 kg (233 lb)   Fetal Heart Rate: 150      Fundal Height (cm): 35 cm        Urine Glucose Read-only: Negative  Urine Protein Read-only: Negative           Assessment and Plan    Problem List Items Addressed This Visit       Pregnancy - Primary    Relevant Orders    POC Urinalysis Dipstick (Completed)     Other Visit Diagnoses       Chlamydia infection during pregnancy        Relevant Medications    azithromycin (ZITHROMAX) 500 MG tablet            Pregnancy at 35w0d  Fetal status reassuring.   Activity and Exercise discussed.  Fetal movement/PTL or Labor precautions  Patient is on Prenatal vitamins  Reviewed  Pre-eclampsia signs/symptoms  GBS next visit  Discussed repeat CS along with bilateral salpingectomy for sterilization and all consents signed. Will set up at 39 weeks.   Will streat chlamydia with azithromycin.   Return in about 1 week (around 12/30/2024) for Routine prenatal care in [place] with me.    Hollis Henning MD  12/23/2024

## 2024-12-31 ENCOUNTER — LAB (OUTPATIENT)
Dept: LAB | Facility: HOSPITAL | Age: 19
End: 2024-12-31
Payer: COMMERCIAL

## 2024-12-31 ENCOUNTER — ROUTINE PRENATAL (OUTPATIENT)
Dept: OBSTETRICS AND GYNECOLOGY | Facility: CLINIC | Age: 19
End: 2024-12-31
Payer: COMMERCIAL

## 2024-12-31 VITALS — SYSTOLIC BLOOD PRESSURE: 124 MMHG | DIASTOLIC BLOOD PRESSURE: 76 MMHG | WEIGHT: 231.6 LBS

## 2024-12-31 DIAGNOSIS — Z98.891 PREVIOUS CESAREAN SECTION: ICD-10-CM

## 2024-12-31 DIAGNOSIS — B00.1 COLD SORE: ICD-10-CM

## 2024-12-31 DIAGNOSIS — Z3A.36 36 WEEKS GESTATION OF PREGNANCY: Primary | ICD-10-CM

## 2024-12-31 PROCEDURE — 87081 CULTURE SCREEN ONLY: CPT | Performed by: OBSTETRICS & GYNECOLOGY

## 2024-12-31 RX ORDER — VALACYCLOVIR HYDROCHLORIDE 1 G/1
1000 TABLET, FILM COATED ORAL 2 TIMES DAILY
Qty: 20 TABLET | Refills: 3 | Status: SHIPPED | OUTPATIENT
Start: 2024-12-31

## 2024-12-31 NOTE — PROGRESS NOTES
OB FOLLOW UP  CC- Here for care of pregnancy        Hannah Collett is a 19 y.o.  36w1d patient being seen today for her obstetrical follow up visit. Patient reports fatigue and mild edema in hands and feet that is intermittent. She complains of cold sore and desires treatment.     Her prenatal care is complicated by (and status) : see below.  Patient Active Problem List   Diagnosis    Pregnancy    Fever blister    Previous  section    Supervision of other normal pregnancy, antepartum    Intrauterine pregnancy in teenager    Smoker    Genital herpes    Cold sore       GBS Status: Done Today. She is not allergic to PCN.    No Known Allergies       Her Delivery Plan is:  R C/S with salpingectomy on 2025 @ 7:30 AM     US today: no  Non Stress Test: No.    ROS -   Patient Denies: Loss of Fluid, Vaginal Spotting, Vision Changes, Headaches, Nausea , Vomiting , Contractions, Epigastric pain, and skin itching  Fetal Movement : normal  All other systems reviewed and are negative.       The additional following portions of the patient's history were reviewed and updated as appropriate: allergies, current medications, past family history, past medical history, past social history, past surgical history, and problem list.    I have reviewed and agree with the HPI, ROS, and historical information as entered above. Hollis Henning MD        EXAM:     Prenatal Vitals  BP: 124/76  Weight: 105 kg (231 lb 9.6 oz)   Fetal Heart Rate: 140   Fundal Height (cm): 36 cm   Dilation/Effacement/Station  Dilation: Closed                  Assessment and Plan    Problem List Items Addressed This Visit       Pregnancy - Primary    Relevant Orders    Group B Streptococcus Culture - Swab, Vaginal/Rectum    Previous  section    Cold sore    Relevant Medications    valACYclovir (Valtrex) 1000 MG tablet    valACYclovir (Valtrex) 1000 MG tablet       Pregnancy at 36w1d  Fetal status reassuring.   Lab(s)  Ordered  Reviewed Pre-eclampsia signs/symptoms  Reviewed upcoming c/s and PAT instructions with patient. Arrival time and NPO status reviewed.   Rx for valacyclovir sent  Delivery options reviewed with patient  Signs of labor reviewed  Kick counts reviewed  Activity and Exercise discussed.  Return in about 1 week (around 1/7/2025) for Routine prenatal care.    Hollis Henning MD  12/31/2024

## 2025-01-03 LAB — BACTERIA SPEC AEROBE CULT: NORMAL

## 2025-01-07 ENCOUNTER — TELEPHONE (OUTPATIENT)
Dept: OBSTETRICS AND GYNECOLOGY | Facility: CLINIC | Age: 20
End: 2025-01-07

## 2025-01-07 NOTE — TELEPHONE ENCOUNTER
left voicemail to let her know she can come this afternoon or tomorrow for her rescheduled appt (due to weather delay) and she can call back to schedule     (Per Dr. Henning)

## 2025-01-08 ENCOUNTER — PATIENT OUTREACH (OUTPATIENT)
Dept: LABOR AND DELIVERY | Facility: HOSPITAL | Age: 20
End: 2025-01-08
Payer: COMMERCIAL

## 2025-01-08 NOTE — OUTREACH NOTE
[facility]  Unable to Reach    Questions/Answers      Flowsheet Row Responses   Pending Outreach Prenatal Check-in   Call Attempt Second   Outcome No answer/busy, Left message   Next Call Attempt Date 01/16/25            Requested she let me know when a good time to talk would be. Contact information provided.    CHARLIE Ramírez RN  Maternity Nurse Navigator    1/8/2025, 10:53 EST

## 2025-01-15 ENCOUNTER — PATIENT OUTREACH (OUTPATIENT)
Dept: LABOR AND DELIVERY | Facility: HOSPITAL | Age: 20
End: 2025-01-15
Payer: COMMERCIAL

## 2025-01-15 NOTE — OUTREACH NOTE
[facility]  Unable to Reach    Questions/Answers      Flowsheet Row Responses   Pending Outreach Prenatal Check-in   Call Attempt Third   Outcome No answer/busy, Left message, MyChart message sent to patient   Unable to reach comments: Will see inpatient after delivery              CHARLIE Ramírez RN  Maternity Nurse Navigator    1/15/2025, 16:35 EST

## 2025-01-20 ENCOUNTER — TELEPHONE (OUTPATIENT)
Dept: OBSTETRICS AND GYNECOLOGY | Facility: CLINIC | Age: 20
End: 2025-01-20
Payer: COMMERCIAL

## 2025-01-20 ENCOUNTER — PREP FOR SURGERY (OUTPATIENT)
Dept: OTHER | Facility: HOSPITAL | Age: 20
End: 2025-01-20
Payer: COMMERCIAL

## 2025-01-20 DIAGNOSIS — Z98.891 PREVIOUS CESAREAN SECTION: Primary | ICD-10-CM

## 2025-01-20 RX ORDER — SODIUM CHLORIDE 0.9 % (FLUSH) 0.9 %
10 SYRINGE (ML) INJECTION EVERY 12 HOURS SCHEDULED
Status: CANCELLED | OUTPATIENT
Start: 2025-01-20

## 2025-01-20 RX ORDER — KETOROLAC TROMETHAMINE 30 MG/ML
30 INJECTION, SOLUTION INTRAMUSCULAR; INTRAVENOUS ONCE
Status: CANCELLED | OUTPATIENT
Start: 2025-01-20 | End: 2025-01-20

## 2025-01-20 RX ORDER — SODIUM CHLORIDE 9 MG/ML
40 INJECTION, SOLUTION INTRAVENOUS AS NEEDED
Status: CANCELLED | OUTPATIENT
Start: 2025-01-20

## 2025-01-20 RX ORDER — OXYTOCIN/0.9 % SODIUM CHLORIDE 30/500 ML
650 PLASTIC BAG, INJECTION (ML) INTRAVENOUS ONCE
Status: CANCELLED | OUTPATIENT
Start: 2025-01-20 | End: 2025-01-20

## 2025-01-20 RX ORDER — MISOPROSTOL 200 UG/1
800 TABLET ORAL AS NEEDED
Status: CANCELLED | OUTPATIENT
Start: 2025-01-20

## 2025-01-20 RX ORDER — METHYLERGONOVINE MALEATE 0.2 MG/ML
200 INJECTION INTRAVENOUS ONCE AS NEEDED
Status: CANCELLED | OUTPATIENT
Start: 2025-01-20

## 2025-01-20 RX ORDER — SODIUM CHLORIDE, SODIUM LACTATE, POTASSIUM CHLORIDE, CALCIUM CHLORIDE 600; 310; 30; 20 MG/100ML; MG/100ML; MG/100ML; MG/100ML
125 INJECTION, SOLUTION INTRAVENOUS CONTINUOUS
Status: CANCELLED | OUTPATIENT
Start: 2025-01-20 | End: 2025-01-20

## 2025-01-20 RX ORDER — ACETAMINOPHEN 500 MG
1000 TABLET ORAL ONCE
Status: CANCELLED | OUTPATIENT
Start: 2025-01-20 | End: 2025-01-20

## 2025-01-20 RX ORDER — SODIUM CHLORIDE 0.9 % (FLUSH) 0.9 %
10 SYRINGE (ML) INJECTION AS NEEDED
Status: CANCELLED | OUTPATIENT
Start: 2025-01-20

## 2025-01-20 RX ORDER — BUPIVACAINE HCL/0.9 % NACL/PF 0.1 %
2000 PLASTIC BAG, INJECTION (ML) EPIDURAL ONCE
Status: CANCELLED | OUTPATIENT
Start: 2025-01-20 | End: 2025-01-20

## 2025-01-20 RX ORDER — LIDOCAINE HYDROCHLORIDE 10 MG/ML
0.5 INJECTION, SOLUTION EPIDURAL; INFILTRATION; INTRACAUDAL; PERINEURAL ONCE AS NEEDED
Status: CANCELLED | OUTPATIENT
Start: 2025-01-20

## 2025-01-20 RX ORDER — CITRIC ACID/SODIUM CITRATE 334-500MG
30 SOLUTION, ORAL ORAL ONCE
Status: CANCELLED | OUTPATIENT
Start: 2025-01-20 | End: 2025-01-20

## 2025-01-20 RX ORDER — CARBOPROST TROMETHAMINE 250 UG/ML
250 INJECTION, SOLUTION INTRAMUSCULAR AS NEEDED
Status: CANCELLED | OUTPATIENT
Start: 2025-01-20

## 2025-01-20 RX ORDER — OXYTOCIN/0.9 % SODIUM CHLORIDE 30/500 ML
85 PLASTIC BAG, INJECTION (ML) INTRAVENOUS ONCE
Status: CANCELLED | OUTPATIENT
Start: 2025-01-20 | End: 2025-01-20

## 2025-01-20 NOTE — TELEPHONE ENCOUNTER
Patient of Dr. Henning;  @ 39w 0d. Patient is scheduled for [facility] tomorrow @ 0730.  Returned patient's call.   States she did not have transportation to get to PAT appointment today but will have transportation tomorrow.  Instructed her to arrive at L&D between 5-5:30 am tomorrow; NPO after midnight tonight.   Patient v/u and agreed. Informed L&D.

## 2025-01-20 NOTE — TELEPHONE ENCOUNTER
Pt called in, was not able to keep PAT appt today  she did not have a ride. Her induction date is scheduled for 1.21.25 @ 7:30 with dr sherman, . I called [facility]  (Ms Jackson- [facility]) states pt should call our office if she needs to move scheduled induction time.

## 2025-01-21 ENCOUNTER — ANESTHESIA (OUTPATIENT)
Dept: LABOR AND DELIVERY | Facility: HOSPITAL | Age: 20
End: 2025-01-21
Payer: COMMERCIAL

## 2025-01-21 ENCOUNTER — HOSPITAL ENCOUNTER (INPATIENT)
Facility: HOSPITAL | Age: 20
LOS: 2 days | Discharge: HOME | End: 2025-01-23
Attending: OBSTETRICS & GYNECOLOGY | Admitting: OBSTETRICS & GYNECOLOGY
Payer: COMMERCIAL

## 2025-01-21 ENCOUNTER — ANESTHESIA EVENT (OUTPATIENT)
Dept: LABOR AND DELIVERY | Facility: HOSPITAL | Age: 20
End: 2025-01-21
Payer: COMMERCIAL

## 2025-01-21 LAB
ABO GROUP BLD: NORMAL
ABO GROUP BLD: NORMAL
ALP SERPL-CCNC: 180 U/L (ref 39–117)
ALT SERPL W P-5'-P-CCNC: 12 U/L (ref 1–33)
AST SERPL-CCNC: 11 U/L (ref 1–32)
BILIRUB SERPL-MCNC: 0.3 MG/DL (ref 0–1.2)
BLD GP AB SCN SERPL QL: NEGATIVE
CREAT SERPL-MCNC: 0.44 MG/DL (ref 0.57–1)
DEPRECATED RDW RBC AUTO: 41.7 FL (ref 37–54)
ERYTHROCYTE [DISTWIDTH] IN BLOOD BY AUTOMATED COUNT: 12.9 % (ref 12.3–15.4)
HCT VFR BLD AUTO: 39.2 % (ref 34–46.6)
HGB BLD-MCNC: 13.3 G/DL (ref 12–15.9)
LDH SERPL-CCNC: 147 U/L (ref 135–214)
MCH RBC QN AUTO: 30.1 PG (ref 26.6–33)
MCHC RBC AUTO-ENTMCNC: 33.9 G/DL (ref 31.5–35.7)
MCV RBC AUTO: 88.7 FL (ref 79–97)
PLATELET # BLD AUTO: 295 10*3/MM3 (ref 140–450)
PMV BLD AUTO: 9.8 FL (ref 6–12)
RBC # BLD AUTO: 4.42 10*6/MM3 (ref 3.77–5.28)
RH BLD: POSITIVE
RH BLD: POSITIVE
T&S EXPIRATION DATE: NORMAL
TREPONEMA PALLIDUM IGG+IGM AB [PRESENCE] IN SERUM OR PLASMA BY IMMUNOASSAY: NORMAL
URATE SERPL-MCNC: 5.6 MG/DL (ref 2.4–5.7)
WBC NRBC COR # BLD AUTO: 16.47 10*3/MM3 (ref 3.4–10.8)

## 2025-01-21 PROCEDURE — 86900 BLOOD TYPING SEROLOGIC ABO: CPT | Performed by: OBSTETRICS & GYNECOLOGY

## 2025-01-21 PROCEDURE — 84075 ASSAY ALKALINE PHOSPHATASE: CPT | Performed by: OBSTETRICS & GYNECOLOGY

## 2025-01-21 PROCEDURE — 25010000002 MIDAZOLAM PER 1 MG: Performed by: ANESTHESIOLOGY

## 2025-01-21 PROCEDURE — 82247 BILIRUBIN TOTAL: CPT | Performed by: OBSTETRICS & GYNECOLOGY

## 2025-01-21 PROCEDURE — 25810000003 LACTATED RINGERS PER 1000 ML: Performed by: OBSTETRICS & GYNECOLOGY

## 2025-01-21 PROCEDURE — 25010000002 OXYTOCIN PER 10 UNITS: Performed by: ANESTHESIOLOGY

## 2025-01-21 PROCEDURE — 86901 BLOOD TYPING SEROLOGIC RH(D): CPT

## 2025-01-21 PROCEDURE — 25010000002 FENTANYL CITRATE (PF) 50 MCG/ML SOLUTION: Performed by: ANESTHESIOLOGY

## 2025-01-21 PROCEDURE — 25010000002 CEFAZOLIN PER 500 MG: Performed by: OBSTETRICS & GYNECOLOGY

## 2025-01-21 PROCEDURE — 25010000002 BUPIVACAINE IN DEXTROSE 0.75-8.25 % SOLUTION: Performed by: ANESTHESIOLOGY

## 2025-01-21 PROCEDURE — 85027 COMPLETE CBC AUTOMATED: CPT | Performed by: OBSTETRICS & GYNECOLOGY

## 2025-01-21 PROCEDURE — 86900 BLOOD TYPING SEROLOGIC ABO: CPT

## 2025-01-21 PROCEDURE — 59025 FETAL NON-STRESS TEST: CPT

## 2025-01-21 PROCEDURE — 86850 RBC ANTIBODY SCREEN: CPT | Performed by: OBSTETRICS & GYNECOLOGY

## 2025-01-21 PROCEDURE — 83615 LACTATE (LD) (LDH) ENZYME: CPT | Performed by: OBSTETRICS & GYNECOLOGY

## 2025-01-21 PROCEDURE — 25810000003 LACTATED RINGERS SOLUTION: Performed by: OBSTETRICS & GYNECOLOGY

## 2025-01-21 PROCEDURE — 25010000002 KETOROLAC TROMETHAMINE PER 15 MG: Performed by: OBSTETRICS & GYNECOLOGY

## 2025-01-21 PROCEDURE — 82565 ASSAY OF CREATININE: CPT | Performed by: OBSTETRICS & GYNECOLOGY

## 2025-01-21 PROCEDURE — 84550 ASSAY OF BLOOD/URIC ACID: CPT | Performed by: OBSTETRICS & GYNECOLOGY

## 2025-01-21 PROCEDURE — 84460 ALANINE AMINO (ALT) (SGPT): CPT | Performed by: OBSTETRICS & GYNECOLOGY

## 2025-01-21 PROCEDURE — 86780 TREPONEMA PALLIDUM: CPT | Performed by: OBSTETRICS & GYNECOLOGY

## 2025-01-21 PROCEDURE — 25010000002 ONDANSETRON PER 1 MG: Performed by: ANESTHESIOLOGY

## 2025-01-21 PROCEDURE — 59515 CESAREAN DELIVERY: CPT | Performed by: OBSTETRICS & GYNECOLOGY

## 2025-01-21 PROCEDURE — 84450 TRANSFERASE (AST) (SGOT): CPT | Performed by: OBSTETRICS & GYNECOLOGY

## 2025-01-21 PROCEDURE — 86901 BLOOD TYPING SEROLOGIC RH(D): CPT | Performed by: OBSTETRICS & GYNECOLOGY

## 2025-01-21 PROCEDURE — 25010000002 METOCLOPRAMIDE PER 10 MG: Performed by: ANESTHESIOLOGY

## 2025-01-21 PROCEDURE — 25010000002 MORPHINE PER 10 MG: Performed by: ANESTHESIOLOGY

## 2025-01-21 RX ORDER — METOCLOPRAMIDE 10 MG/1
10 TABLET ORAL ONCE AS NEEDED
Status: DISCONTINUED | OUTPATIENT
Start: 2025-01-21 | End: 2025-01-23 | Stop reason: HOSPADM

## 2025-01-21 RX ORDER — OXYTOCIN 10 [USP'U]/ML
INJECTION, SOLUTION INTRAMUSCULAR; INTRAVENOUS AS NEEDED
Status: DISCONTINUED | OUTPATIENT
Start: 2025-01-21 | End: 2025-01-21 | Stop reason: SURG

## 2025-01-21 RX ORDER — KETOROLAC TROMETHAMINE 15 MG/ML
15 INJECTION, SOLUTION INTRAMUSCULAR; INTRAVENOUS EVERY 6 HOURS
Status: COMPLETED | OUTPATIENT
Start: 2025-01-21 | End: 2025-01-22

## 2025-01-21 RX ORDER — OXYTOCIN/0.9 % SODIUM CHLORIDE 30/500 ML
85 PLASTIC BAG, INJECTION (ML) INTRAVENOUS ONCE
Status: COMPLETED | OUTPATIENT
Start: 2025-01-21 | End: 2025-01-21

## 2025-01-21 RX ORDER — FAMOTIDINE 10 MG/ML
INJECTION, SOLUTION INTRAVENOUS AS NEEDED
Status: DISCONTINUED | OUTPATIENT
Start: 2025-01-21 | End: 2025-01-21 | Stop reason: SURG

## 2025-01-21 RX ORDER — METOCLOPRAMIDE HYDROCHLORIDE 5 MG/ML
INJECTION INTRAMUSCULAR; INTRAVENOUS AS NEEDED
Status: DISCONTINUED | OUTPATIENT
Start: 2025-01-21 | End: 2025-01-21 | Stop reason: SURG

## 2025-01-21 RX ORDER — ONDANSETRON 2 MG/ML
4 INJECTION INTRAMUSCULAR; INTRAVENOUS EVERY 6 HOURS PRN
Status: DISCONTINUED | OUTPATIENT
Start: 2025-01-21 | End: 2025-01-23 | Stop reason: HOSPADM

## 2025-01-21 RX ORDER — BUPIVACAINE HYDROCHLORIDE 7.5 MG/ML
INJECTION, SOLUTION INTRASPINAL
Status: COMPLETED | OUTPATIENT
Start: 2025-01-21 | End: 2025-01-21

## 2025-01-21 RX ORDER — KETOROLAC TROMETHAMINE 30 MG/ML
30 INJECTION, SOLUTION INTRAMUSCULAR; INTRAVENOUS ONCE
Status: COMPLETED | OUTPATIENT
Start: 2025-01-21 | End: 2025-01-21

## 2025-01-21 RX ORDER — IBUPROFEN 600 MG/1
600 TABLET, FILM COATED ORAL EVERY 6 HOURS
Status: DISCONTINUED | OUTPATIENT
Start: 2025-01-22 | End: 2025-01-23 | Stop reason: HOSPADM

## 2025-01-21 RX ORDER — DIPHENHYDRAMINE HYDROCHLORIDE 50 MG/ML
25 INJECTION INTRAMUSCULAR; INTRAVENOUS EVERY 4 HOURS PRN
Status: DISCONTINUED | OUTPATIENT
Start: 2025-01-21 | End: 2025-01-23 | Stop reason: HOSPADM

## 2025-01-21 RX ORDER — ENOXAPARIN SODIUM 100 MG/ML
40 INJECTION SUBCUTANEOUS NIGHTLY
Status: DISCONTINUED | OUTPATIENT
Start: 2025-01-22 | End: 2025-01-23 | Stop reason: HOSPADM

## 2025-01-21 RX ORDER — HYDROCORTISONE 25 MG/G
1 CREAM TOPICAL AS NEEDED
Status: DISCONTINUED | OUTPATIENT
Start: 2025-01-21 | End: 2025-01-23 | Stop reason: HOSPADM

## 2025-01-21 RX ORDER — DIPHENHYDRAMINE HYDROCHLORIDE 50 MG/ML
25 INJECTION INTRAMUSCULAR; INTRAVENOUS ONCE AS NEEDED
Status: DISCONTINUED | OUTPATIENT
Start: 2025-01-21 | End: 2025-01-23 | Stop reason: HOSPADM

## 2025-01-21 RX ORDER — MORPHINE SULFATE 0.5 MG/ML
INJECTION, SOLUTION EPIDURAL; INTRATHECAL; INTRAVENOUS
Status: COMPLETED | OUTPATIENT
Start: 2025-01-21 | End: 2025-01-21

## 2025-01-21 RX ORDER — DIPHENHYDRAMINE HCL 25 MG
25 CAPSULE ORAL EVERY 4 HOURS PRN
Status: DISCONTINUED | OUTPATIENT
Start: 2025-01-21 | End: 2025-01-23 | Stop reason: HOSPADM

## 2025-01-21 RX ORDER — DOCUSATE SODIUM 100 MG/1
100 CAPSULE, LIQUID FILLED ORAL 2 TIMES DAILY PRN
Status: DISCONTINUED | OUTPATIENT
Start: 2025-01-21 | End: 2025-01-23 | Stop reason: HOSPADM

## 2025-01-21 RX ORDER — ACETAMINOPHEN 500 MG
1000 TABLET ORAL ONCE
Status: COMPLETED | OUTPATIENT
Start: 2025-01-21 | End: 2025-01-21

## 2025-01-21 RX ORDER — MIDAZOLAM HYDROCHLORIDE 1 MG/ML
INJECTION, SOLUTION INTRAMUSCULAR; INTRAVENOUS AS NEEDED
Status: DISCONTINUED | OUTPATIENT
Start: 2025-01-21 | End: 2025-01-21 | Stop reason: SURG

## 2025-01-21 RX ORDER — ONDANSETRON 2 MG/ML
4 INJECTION INTRAMUSCULAR; INTRAVENOUS ONCE AS NEEDED
Status: ACTIVE | OUTPATIENT
Start: 2025-01-21 | End: 2025-01-22

## 2025-01-21 RX ORDER — SODIUM CHLORIDE 0.9 % (FLUSH) 0.9 %
10 SYRINGE (ML) INJECTION EVERY 12 HOURS SCHEDULED
Status: DISCONTINUED | OUTPATIENT
Start: 2025-01-21 | End: 2025-01-21 | Stop reason: HOSPADM

## 2025-01-21 RX ORDER — OXYTOCIN/0.9 % SODIUM CHLORIDE 30/500 ML
PLASTIC BAG, INJECTION (ML) INTRAVENOUS AS NEEDED
Status: DISCONTINUED | OUTPATIENT
Start: 2025-01-21 | End: 2025-01-21 | Stop reason: SURG

## 2025-01-21 RX ORDER — METHYLERGONOVINE MALEATE 0.2 MG/ML
200 INJECTION INTRAVENOUS ONCE AS NEEDED
Status: DISCONTINUED | OUTPATIENT
Start: 2025-01-21 | End: 2025-01-21 | Stop reason: HOSPADM

## 2025-01-21 RX ORDER — CITRIC ACID/SODIUM CITRATE 334-500MG
30 SOLUTION, ORAL ORAL ONCE
Status: COMPLETED | OUTPATIENT
Start: 2025-01-21 | End: 2025-01-21

## 2025-01-21 RX ORDER — PROMETHAZINE HYDROCHLORIDE 25 MG/1
25 TABLET ORAL ONCE AS NEEDED
Status: DISCONTINUED | OUTPATIENT
Start: 2025-01-21 | End: 2025-01-23 | Stop reason: HOSPADM

## 2025-01-21 RX ORDER — MISOPROSTOL 200 UG/1
800 TABLET ORAL AS NEEDED
Status: DISCONTINUED | OUTPATIENT
Start: 2025-01-21 | End: 2025-01-21 | Stop reason: HOSPADM

## 2025-01-21 RX ORDER — CALCIUM CARBONATE 500 MG/1
1 TABLET, CHEWABLE ORAL EVERY 4 HOURS PRN
Status: DISCONTINUED | OUTPATIENT
Start: 2025-01-21 | End: 2025-01-23 | Stop reason: HOSPADM

## 2025-01-21 RX ORDER — ALUMINA, MAGNESIA, AND SIMETHICONE 2400; 2400; 240 MG/30ML; MG/30ML; MG/30ML
15 SUSPENSION ORAL EVERY 4 HOURS PRN
Status: DISCONTINUED | OUTPATIENT
Start: 2025-01-21 | End: 2025-01-23 | Stop reason: HOSPADM

## 2025-01-21 RX ORDER — OXYCODONE HYDROCHLORIDE 5 MG/1
5 TABLET ORAL EVERY 4 HOURS PRN
Status: DISCONTINUED | OUTPATIENT
Start: 2025-01-21 | End: 2025-01-23 | Stop reason: HOSPADM

## 2025-01-21 RX ORDER — SODIUM CHLORIDE 0.9 % (FLUSH) 0.9 %
10 SYRINGE (ML) INJECTION AS NEEDED
Status: DISCONTINUED | OUTPATIENT
Start: 2025-01-21 | End: 2025-01-21 | Stop reason: HOSPADM

## 2025-01-21 RX ORDER — NALOXONE HCL 0.4 MG/ML
0.4 VIAL (ML) INJECTION
Status: ACTIVE | OUTPATIENT
Start: 2025-01-21 | End: 2025-01-22

## 2025-01-21 RX ORDER — OXYCODONE HYDROCHLORIDE 10 MG/1
10 TABLET ORAL EVERY 4 HOURS PRN
Status: DISCONTINUED | OUTPATIENT
Start: 2025-01-21 | End: 2025-01-23 | Stop reason: HOSPADM

## 2025-01-21 RX ORDER — NICOTINE 21 MG/24HR
1 PATCH, TRANSDERMAL 24 HOURS TRANSDERMAL EVERY 24 HOURS
Status: DISCONTINUED | OUTPATIENT
Start: 2025-01-21 | End: 2025-01-23 | Stop reason: HOSPADM

## 2025-01-21 RX ORDER — ONDANSETRON 4 MG/1
4 TABLET, ORALLY DISINTEGRATING ORAL EVERY 6 HOURS PRN
Status: DISCONTINUED | OUTPATIENT
Start: 2025-01-21 | End: 2025-01-23 | Stop reason: HOSPADM

## 2025-01-21 RX ORDER — ONDANSETRON 2 MG/ML
INJECTION INTRAMUSCULAR; INTRAVENOUS AS NEEDED
Status: DISCONTINUED | OUTPATIENT
Start: 2025-01-21 | End: 2025-01-21 | Stop reason: SURG

## 2025-01-21 RX ORDER — CARBOPROST TROMETHAMINE 250 UG/ML
250 INJECTION, SOLUTION INTRAMUSCULAR AS NEEDED
Status: DISCONTINUED | OUTPATIENT
Start: 2025-01-21 | End: 2025-01-21 | Stop reason: HOSPADM

## 2025-01-21 RX ORDER — OXYTOCIN/0.9 % SODIUM CHLORIDE 30/500 ML
125 PLASTIC BAG, INJECTION (ML) INTRAVENOUS ONCE AS NEEDED
Status: DISCONTINUED | OUTPATIENT
Start: 2025-01-21 | End: 2025-01-23 | Stop reason: HOSPADM

## 2025-01-21 RX ORDER — SIMETHICONE 80 MG
80 TABLET,CHEWABLE ORAL 4 TIMES DAILY PRN
Status: DISCONTINUED | OUTPATIENT
Start: 2025-01-21 | End: 2025-01-23 | Stop reason: HOSPADM

## 2025-01-21 RX ORDER — LIDOCAINE HYDROCHLORIDE 10 MG/ML
0.5 INJECTION, SOLUTION EPIDURAL; INFILTRATION; INTRACAUDAL; PERINEURAL ONCE AS NEEDED
Status: DISCONTINUED | OUTPATIENT
Start: 2025-01-21 | End: 2025-01-21 | Stop reason: HOSPADM

## 2025-01-21 RX ORDER — PROMETHAZINE HYDROCHLORIDE 12.5 MG/1
12.5 SUPPOSITORY RECTAL ONCE AS NEEDED
Status: DISCONTINUED | OUTPATIENT
Start: 2025-01-21 | End: 2025-01-23 | Stop reason: HOSPADM

## 2025-01-21 RX ORDER — FENTANYL CITRATE 50 UG/ML
INJECTION, SOLUTION INTRAMUSCULAR; INTRAVENOUS
Status: COMPLETED | OUTPATIENT
Start: 2025-01-21 | End: 2025-01-21

## 2025-01-21 RX ORDER — PRENATAL VIT/IRON FUM/FOLIC AC 27MG-0.8MG
1 TABLET ORAL DAILY
Status: DISCONTINUED | OUTPATIENT
Start: 2025-01-21 | End: 2025-01-23 | Stop reason: HOSPADM

## 2025-01-21 RX ORDER — OXYTOCIN/0.9 % SODIUM CHLORIDE 30/500 ML
650 PLASTIC BAG, INJECTION (ML) INTRAVENOUS ONCE
Status: DISCONTINUED | OUTPATIENT
Start: 2025-01-21 | End: 2025-01-21 | Stop reason: HOSPADM

## 2025-01-21 RX ORDER — SODIUM CHLORIDE 9 MG/ML
40 INJECTION, SOLUTION INTRAVENOUS AS NEEDED
Status: DISCONTINUED | OUTPATIENT
Start: 2025-01-21 | End: 2025-01-21 | Stop reason: HOSPADM

## 2025-01-21 RX ORDER — SODIUM CHLORIDE, SODIUM LACTATE, POTASSIUM CHLORIDE, CALCIUM CHLORIDE 600; 310; 30; 20 MG/100ML; MG/100ML; MG/100ML; MG/100ML
125 INJECTION, SOLUTION INTRAVENOUS CONTINUOUS
Status: ACTIVE | OUTPATIENT
Start: 2025-01-21 | End: 2025-01-21

## 2025-01-21 RX ORDER — ACETAMINOPHEN 500 MG
1000 TABLET ORAL EVERY 6 HOURS
Status: COMPLETED | OUTPATIENT
Start: 2025-01-21 | End: 2025-01-22

## 2025-01-21 RX ORDER — ACETAMINOPHEN 325 MG/1
650 TABLET ORAL EVERY 6 HOURS
Status: DISCONTINUED | OUTPATIENT
Start: 2025-01-22 | End: 2025-01-23 | Stop reason: HOSPADM

## 2025-01-21 RX ADMIN — SODIUM CITRATE AND CITRIC ACID MONOHYDRATE 30 ML: 500; 334 SOLUTION ORAL at 07:34

## 2025-01-21 RX ADMIN — BUPIVACAINE HYDROCHLORIDE IN DEXTROSE 1.4 ML: 7.5 INJECTION, SOLUTION SUBARACHNOID at 07:55

## 2025-01-21 RX ADMIN — KETOROLAC TROMETHAMINE 15 MG: 15 INJECTION, SOLUTION INTRAMUSCULAR; INTRAVENOUS at 16:08

## 2025-01-21 RX ADMIN — OXYTOCIN 3 UNITS: 10 INJECTION, SOLUTION INTRAMUSCULAR; INTRAVENOUS at 08:03

## 2025-01-21 RX ADMIN — DOCUSATE SODIUM 100 MG: 100 CAPSULE, LIQUID FILLED ORAL at 22:37

## 2025-01-21 RX ADMIN — ACETAMINOPHEN 1000 MG: 500 TABLET ORAL at 06:43

## 2025-01-21 RX ADMIN — SODIUM CHLORIDE, POTASSIUM CHLORIDE, SODIUM LACTATE AND CALCIUM CHLORIDE: 600; 310; 30; 20 INJECTION, SOLUTION INTRAVENOUS at 07:39

## 2025-01-21 RX ADMIN — ACETAMINOPHEN 1000 MG: 500 TABLET ORAL at 12:59

## 2025-01-21 RX ADMIN — ONDANSETRON 4 MG: 2 INJECTION INTRAMUSCULAR; INTRAVENOUS at 07:38

## 2025-01-21 RX ADMIN — OXYTOCIN 4 UNITS: 10 INJECTION, SOLUTION INTRAMUSCULAR; INTRAVENOUS at 08:09

## 2025-01-21 RX ADMIN — SIMETHICONE 80 MG: 80 TABLET, CHEWABLE ORAL at 22:37

## 2025-01-21 RX ADMIN — KETOROLAC TROMETHAMINE 30 MG: 30 INJECTION, SOLUTION INTRAMUSCULAR; INTRAVENOUS at 09:35

## 2025-01-21 RX ADMIN — METOCLOPRAMIDE 10 MG: 5 INJECTION, SOLUTION INTRAMUSCULAR; INTRAVENOUS at 07:38

## 2025-01-21 RX ADMIN — KETOROLAC TROMETHAMINE 15 MG: 15 INJECTION, SOLUTION INTRAMUSCULAR; INTRAVENOUS at 22:37

## 2025-01-21 RX ADMIN — SODIUM CHLORIDE, POTASSIUM CHLORIDE, SODIUM LACTATE AND CALCIUM CHLORIDE 1000 ML: 600; 310; 30; 20 INJECTION, SOLUTION INTRAVENOUS at 06:30

## 2025-01-21 RX ADMIN — ACETAMINOPHEN 1000 MG: 500 TABLET ORAL at 18:07

## 2025-01-21 RX ADMIN — MORPHINE SULFATE 0.15 MG: 0.5 INJECTION, SOLUTION EPIDURAL; INTRATHECAL; INTRAVENOUS at 07:55

## 2025-01-21 RX ADMIN — Medication 500 ML: at 08:20

## 2025-01-21 RX ADMIN — SODIUM CHLORIDE, POTASSIUM CHLORIDE, SODIUM LACTATE AND CALCIUM CHLORIDE 1000 ML/HR: 600; 310; 30; 20 INJECTION, SOLUTION INTRAVENOUS at 07:33

## 2025-01-21 RX ADMIN — FAMOTIDINE 20 MG: 10 INJECTION, SOLUTION INTRAVENOUS at 07:38

## 2025-01-21 RX ADMIN — Medication 85 ML/HR: at 08:49

## 2025-01-21 RX ADMIN — OXYTOCIN 3 UNITS: 10 INJECTION, SOLUTION INTRAMUSCULAR; INTRAVENOUS at 08:07

## 2025-01-21 RX ADMIN — SODIUM CHLORIDE 2000 MG: 900 INJECTION INTRAVENOUS at 07:34

## 2025-01-21 RX ADMIN — MIDAZOLAM HYDROCHLORIDE 2 MG: 1 INJECTION, SOLUTION INTRAMUSCULAR; INTRAVENOUS at 07:38

## 2025-01-21 RX ADMIN — FENTANYL CITRATE 15 MCG: 50 INJECTION, SOLUTION INTRAMUSCULAR; INTRAVENOUS at 07:55

## 2025-01-21 NOTE — ANESTHESIA POSTPROCEDURE EVALUATION
Patient: Hannah Collett    Procedure Summary       Date: 25 Room / Location: [facility] LABOR DELIVERY   [facility] LABOR DELIVERY    Anesthesia Start: 738 Anesthesia Stop: 832    Procedure:  SECTION REPEAT (Abdomen) Diagnosis:     Surgeons: Hollis Henning MD Provider: Lien De La Rosa DO    Anesthesia Type: ITN, spinal ASA Status: 3            Anesthesia Type: ITN, spinal    Vitals  Vitals Value Taken Time   /54    Temp 97.6    Pulse 81 25 0831   Resp 16    SpO2 96 % 2531   Vitals shown include unfiled device data.        Post Anesthesia Care and Evaluation    Patient location during evaluation: bedside  Patient participation: complete - patient participated  Level of consciousness: awake and alert  Pain management: adequate    Airway patency: patent  Anesthetic complications: No anesthetic complications    Cardiovascular status: acceptable  Respiratory status: acceptable  Hydration status: acceptable

## 2025-01-21 NOTE — OP NOTE
Section Procedure Note    Indications: previous  section low transverse    Pre-operative Diagnosis: 39 week 1 day pregnancy.    Post-operative Diagnosis: same    Assist: Ksenia Moore    Surgeon: Hollis Henning MD     Procedure: Repeat  section, low transverse uterine incision    Anesthesia: Spinal anesthesia    ASA Class: 2    Procedure Details   The patient was seen in the Holding Room. The risks, benefits, complications, treatment options, and expected outcomes were discussed with the patient.  The patient concurred with the proposed plan, giving informed consent.  The site of surgery properly noted/marked. The patient was taken to the Operating Room, identified as Hannah Collett and the procedure verified as  Delivery. A Time Out was held and the above information confirmed.    After induction of anesthesia, the patient was draped and prepped in the usual sterile manner. A Pfannenstiel incision was made and carried down through the subcutaneous tissue to the fascia. Fascial incision was made and extended transversely. The fascia was  from the underlying rectus tissue superiorly and inferiorly. The peritoneum was identified and entered. Peritoneal incision was extended longitudinally.  A low transverse uterine incision was made. Delivered from vertex presentation was a girl with apgars scores of         APGARS  One minute Five minutes Ten minutes Fifteen minutes Twenty minutes   Skin color: 1   1             Heart rate: 2   2             Grimace: 2   2              Muscle tone: 2   2              Breathin   2              Totals: 8   9              . Delayed cord clamping performed, then the cord was clamped and cut cord blood was obtained for evaluation. The placenta was removed intact and appeared normal. The uterine outline, tubes and ovaries appeared normal. The uterine incision was closed with a running locked sutures of  1 Monocryl. Hemostasis was  observed and the uterus returned to the peritoneal cavity.  Pericolic gutters were irrigated and cleared of all clot and debris.  The rectus muscles were reapproximated with interrupted stitches of 2.0 vicryl. The fascia was then closed with running sutures of 1 Looped PDS.  The skin was reapproximated with 3-0 Monocryl followed by skin glue.    Instrument, sponge, and needle counts were correct prior the abdominal closure and at the conclusion of the case.     Findings:  Viable female infant    Estimated Blood Loss:   350ml           Drains: wang           Total IV Fluids: 1200ml           Specimens: placenta                   Complications:  None; patient tolerated the procedure well.           Disposition: PACU - hemodynamically stable.           Condition: stable    Attending Attestation: I performed the procedure.    Hollis Henning MD  01/21/25

## 2025-01-21 NOTE — H&P
[place] [place]    Referring Provider: No Known Provider      Chief Complaint   Patient presents with    Scheduled        Subjective     Patient is a 19 y.o. female  currently at 39w1d, who presents for scheduled repeat  section.     Her prenatal care is complicated by  prior   desires repeat .  Her previous obstetric/gynecological history is noted for is remarkable for prior  section.    The following portions of the patients history were reviewed and updated as appropriate: current medications, allergies, past medical history, past surgical history, past family history, past social history, and problem list .       Prenatal Information:   Maternal Prenatal Labs  Blood Type ABO Type   Date Value Ref Range Status   2025 O  Final      Rh Status RH type   Date Value Ref Range Status   2025 Positive  Final      Antibody Screen Antibody Screen   Date Value Ref Range Status   2025 Negative  Final      Gonnorhea No results found for: \"GCCX\"   Chlamydia No results found for: \"CLAMYDCU\"   RPR No results found for: \"RPR\"   Syphilis Antibody No results found for: \"SYPHILIS\"   Rubella No results found for: \"RUBELLAIGGIN\"   Hepatitis B Surface Antigen No results found for: \"HEPBSAG\"   HIV-1 Antibody No results found for: \"LABHIV1\"   Hepatitis C Antibody No results found for: \"HEPCAB\"   Rapid Urin Drug Screen No results found for: \"AMPMETHU\", \"BARBITSCNUR\", \"LABBENZSCN\", \"LABMETHSCN\", \"LABOPIASCN\", \"THCURSCR\", \"COCAINEUR\", \"AMPHETSCREEN\", \"PROPOXSCN\", \"BUPRENORSCNU\", \"METAMPSCNUR\", \"OXYCODONESCN\", \"TRICYCLICSCN\"   Group B Strep Culture No results found for: \"GBSANTIGEN\"           External Prenatal Results       Pregnancy Outside Results - Transcribed From Office Records - See Scanned Records For Details       Test Value Date Time    ABO  O  25 06    Rh  Positive  25 06    Antibody Screen  Negative  25 06       " Negative  10/29/24 1120       Negative  07/26/24 1044    Varicella IgG  239 index 07/26/24 1044    Rubella  1.14 index 07/26/24 1044    Hgb  13.3 g/dL 01/21/25 0609      ^ 13 g/dL 12/19/24 0439      ^ 14.1 g/dL 12/18/24 1246       12.3 g/dL 10/29/24 1120       13.8 g/dL 07/26/24 1044      ^ 14.4 g/dL 07/08/24 1750      ^ 14.2 g/dL 06/09/24 2018    Hct  39.2 % 01/21/25 0609      ^ 38.3 % 12/19/24 0439      ^ 40.8 % 12/18/24 1246       37.5 % 10/29/24 1120       42.3 % 07/26/24 1044      ^ 41.6 % 07/08/24 1750      ^ 41.2 % 06/09/24 2018    HgB A1c   5.0 % 07/26/24 1044    1h GTT  109 mg/dL 10/29/24 1120    3h GTT Fasting       3h GTT 1 hour       3h GTT 2 hour       3h GTT 3 hour        Gonorrhea (discrete)  Negative  07/26/24 1044    Chlamydia (discrete)  Negative  07/26/24 1044    RPR ^ Nonreactive  12/18/24 1409       Non Reactive  10/29/24 1120       Non Reactive  07/26/24 1044    Syphils cascade: TP-Ab (FTA)       TP-Ab       TP-Ab (EIA)       TPPA       HBsAg  Negative  07/26/24 1044    Herpes Simplex Virus PCR       Herpes Simplex VIrus Culture       HIV  Non Reactive  07/26/24 1044    Hep C RNA Quant PCR       Hep C Antibody  Non Reactive  07/26/24 1044    AFP       NIPT       Cystic Fibrosis (Beena)       Cystic Fibroisis        Spinal Muscular atrophy       Fragile X       Group B Strep  No Group B Streptococcus isolated  12/31/24 1749    GBS Susceptibility to Clindamycin       GBS Susceptibility to Erythromycin       Fetal Fibronectin       Genetic Testing, Maternal Blood                 Drug Screening       Test Value Date Time    Urine Drug Screen       Amphetamine Screen  Negative ng/mL 07/26/24 1044    Barbiturate Screen  Negative ng/mL 07/26/24 1044    Benzodiazepine Screen  Negative ng/mL 07/26/24 1044    Methadone Screen  Negative ng/mL 07/26/24 1044    Phencyclidine Screen  Negative ng/mL 07/26/24 1044    Opiates Screen       THC Screen       Cocaine Screen       Propoxyphene Screen  Negative  ng/mL 24 1044    Buprenorphine Screen       Methamphetamine Screen       Oxycodone Screen       Tricyclic Antidepressants Screen                 Legend    ^: Historical                              Past OB History:       OB History    Para Term  AB Living   2 1 1 0 0 1   SAB IAB Ectopic Molar Multiple Live Births   0 0 0 0 0 1      # Outcome Date GA Lbr Antione/2nd Weight Sex Type Anes PTL Lv   2 Current            1 Term 21 39w0d  3827 g (8 lb 7 oz) M CS-LTranv Spinal  ARUNA      Birth Comments: [facility]      Obstetric Comments   FOB 1 for G1 and G2       Past Medical History: Past Medical History:   Diagnosis Date    HSV-2 infection     Ovarian cyst       Past Surgical History Past Surgical History:   Procedure Laterality Date     SECTION      WISDOM TOOTH EXTRACTION        Family History: Family History   Problem Relation Age of Onset    Breast cancer Maternal Grandmother     Ovarian cancer Maternal Grandmother     Uterine cancer Neg Hx     Colon cancer Neg Hx       Social History:  reports that she has been smoking cigarettes. She started smoking about 3 years ago. She has a 1.5 pack-year smoking history. She has never been exposed to tobacco smoke. She does not have any smokeless tobacco history on file.   reports no history of alcohol use.   reports that she does not currently use drugs after having used the following drugs: Marijuana.                   General ROS Negative Findings:Headaches, Visual Changes, Epigastric pain, Anorexia, Nausia/Vomiting, ROM, and Vaginal Bleeding    ROS     All other systems have been reviewed and are neg  Objective       Vital Signs Range for the last 24 hours  Temperature: Temp:  [97.9 °F (36.6 °C)] 97.9 °F (36.6 °C)   Temp Source: Temp src: Oral   BP: BP: (119-123)/(88-97) 123/88   Pulse:     Respirations: Resp:  [16] 16   SPO2:     O2 Amount (l/min):     O2 Devices     Weight: Weight:  [106 kg (233 lb)] 106 kg (233  lb)     Physical Examination:   General:   alert, appears stated age, and cooperative   Skin:   normal   HEENT:     Lungs:   clear to auscultation bilaterally   Heart:   regular rate and rhythm, S1, S2 normal, no murmur, click, rub or gallop   Abdomen:  soft, non-tender; bowel sounds normal; no masses,  no organomegaly   Lower Extremities    Pelvis:  Exam deferred.         Presentation: vertex   Cervix: Exam by:     Dilation:     Effacement:     Station:         Fetal Heart Rate Assessment   Method: Fetal HR Assessment Method: external   Beats/min: Fetal HR (beats/min): 125   Baseline: Fetal HR Baseline: normal range   Varibility: Fetal HR Variability: moderate (amplitude range 6 to 25 bpm)   Accels: Fetal HR Accelerations: greater than/equal to 15 bpm, lasting at least 15 seconds   Decels: Fetal HR Decelerations: absent   Tracing Category:       Uterine Assessment   Method: Method: external tocotransducer   Frequency (min): Contraction Frequency (Minutes): x2   Ctx Count in 10 min:     Duration:     Intensity:     Intensity by IUPC:     Resting Tone:     Resting Tone by IUPC:     [place] Units:       Laboratory Results:   Lab Results (last 24 hours)       Procedure Component Value Units Date/Time    Preeclampsia Panel [[id number]]  (Abnormal) Collected: 01/21/25 0609    Specimen: Blood Updated: 01/21/25 0659     Alkaline Phosphatase 180 U/L      ALT (SGPT) 12 U/L      AST (SGOT) 11 U/L      Creatinine 0.44 mg/dL      Total Bilirubin 0.3 mg/dL       U/L      Comment: Specimen hemolyzed.  Results may be affected.        Uric Acid 5.6 mg/dL     CBC (No Diff) [[id number]]  (Abnormal) Collected: 01/21/25 0609    Specimen: Blood Updated: 01/21/25 0623     WBC 16.47 10*3/mm3      RBC 4.42 10*6/mm3      Hemoglobin 13.3 g/dL      Hematocrit 39.2 %      MCV 88.7 fL      MCH 30.1 pg      MCHC 33.9 g/dL      RDW 12.9 %      RDW-SD 41.7 fl      MPV 9.8 fL      Platelets 295 10*3/mm3     Treponema pallidum AB w/Reflex  RPR [[id number]] Collected: 25    Specimen: Blood Updated: 25          Radiology Review:   Imaging Results (Last 24 Hours)       ** No results found for the last 24 hours. **          Other Studies:    Assessment & Plan       Pregnancy        Assessment:  1.  Intrauterine pregnancy at 39w1d weeks gestation with reactive fetal status.    2.  Previous  section      Plan:  1. Repeat  scheduled  2. Plan of care has been reviewed with patient.  3.  Risks, benefits of treatment plan have been discussed.  4.  All questions have been answered.  5. Will not perform salpingectomy at time of surgery due to her age. Explained that I would be able to do so once she turns 20 in August      Hollis Henning MD  2025  07:29 EST

## 2025-01-21 NOTE — ANESTHESIA PROCEDURE NOTES
Spinal Block      Patient reassessed immediately prior to procedure    Patient location during procedure: OB  Indication:procedure for pain  Performed By  Anesthesiologist: Lien De La Rosa DO  Preanesthetic Checklist  Completed: patient identified, IV checked, risks and benefits discussed, surgical consent, monitors and equipment checked, pre-op evaluation and timeout performed  Spinal Block Prep:  Patient Position:sitting  Sterile Tech:cap, gloves, mask and sterile barriers  Prep:Chloraprep  Patient Monitoring:blood pressure monitoring and EKG    Spinal Block Procedure  Approach:midline  Guidance:palpation technique  Location:L3-L4  Needle Type:Estephania  Needle Gauge:25 G  Placement of Spinal needle event:cerebrospinal fluid aspirated  Paresthesia: no  Fluid Appearance:clear  Medications: bupivacaine in dextrose (MARCAINE SPINAL / Hyberbaric) 0.75-8.25 % injection - Intrathecal   1.4 mL - 1/21/2025 7:55:00 AM  morphine PF (ASTRAMORPH) injection - Intrathecal   0.15 mg - 1/21/2025 7:55:00 AM  fentaNYL citrate (PF) (SUBLIMAZE) injection - Intrathecal   15 mcg - 1/21/2025 7:55:00 AM   Post Assessment  Patient Tolerance:patient tolerated the procedure well with no apparent complications  Complications no

## 2025-01-21 NOTE — ANESTHESIA PREPROCEDURE EVALUATION
Anesthesia Evaluation     Patient summary reviewed and Nursing notes reviewed   NPO Solid Status: > 6 hours  NPO Liquid Status: < 2 hours           Airway   Mallampati: II  TM distance: >3 FB  Neck ROM: full  Dental      Pulmonary    (+) a smoker Current, Smoked day of surgery, cigarettes,  Cardiovascular - negative cardio ROS        Neuro/Psych- negative ROS  GI/Hepatic/Renal/Endo - negative ROS   (+) morbid obesity    Musculoskeletal (-) negative ROS    Abdominal    Substance History - negative use     OB/GYN negative ob/gyn ROS   (+) Pregnant        Other                    Anesthesia Plan    ASA 3     ITN and spinal       Anesthetic plan, risks, benefits, and alternatives have been provided, discussed and informed consent has been obtained with: patient.    Use of blood products discussed with patient .    Plan discussed with attending.    CODE STATUS:    Level Of Support Discussed With: Patient  Code Status (Patient has no pulse and is not breathing): CPR (Attempt to Resuscitate)  Medical Interventions (Patient has pulse or is breathing): Full Support

## 2025-01-22 ENCOUNTER — PATIENT OUTREACH (OUTPATIENT)
Dept: LABOR AND DELIVERY | Facility: HOSPITAL | Age: 20
End: 2025-01-22
Payer: COMMERCIAL

## 2025-01-22 LAB
BASOPHILS # BLD AUTO: 0.04 10*3/MM3 (ref 0–0.2)
BASOPHILS NFR BLD AUTO: 0.3 % (ref 0–1.5)
DEPRECATED RDW RBC AUTO: 44 FL (ref 37–54)
EOSINOPHIL # BLD AUTO: 0.13 10*3/MM3 (ref 0–0.4)
EOSINOPHIL NFR BLD AUTO: 0.9 % (ref 0.3–6.2)
ERYTHROCYTE [DISTWIDTH] IN BLOOD BY AUTOMATED COUNT: 13.1 % (ref 12.3–15.4)
HCT VFR BLD AUTO: 36.6 % (ref 34–46.6)
HGB BLD-MCNC: 11.8 G/DL (ref 12–15.9)
IMM GRANULOCYTES # BLD AUTO: 0.09 10*3/MM3 (ref 0–0.05)
IMM GRANULOCYTES NFR BLD AUTO: 0.6 % (ref 0–0.5)
LYMPHOCYTES # BLD AUTO: 3.77 10*3/MM3 (ref 0.7–3.1)
LYMPHOCYTES NFR BLD AUTO: 24.9 % (ref 19.6–45.3)
MCH RBC QN AUTO: 29.8 PG (ref 26.6–33)
MCHC RBC AUTO-ENTMCNC: 32.2 G/DL (ref 31.5–35.7)
MCV RBC AUTO: 92.4 FL (ref 79–97)
MONOCYTES # BLD AUTO: 1.73 10*3/MM3 (ref 0.1–0.9)
MONOCYTES NFR BLD AUTO: 11.4 % (ref 5–12)
NEUTROPHILS NFR BLD AUTO: 61.9 % (ref 42.7–76)
NEUTROPHILS NFR BLD AUTO: 9.37 10*3/MM3 (ref 1.7–7)
NRBC BLD AUTO-RTO: 0 /100 WBC (ref 0–0.2)
PLATELET # BLD AUTO: 266 10*3/MM3 (ref 140–450)
PMV BLD AUTO: 9.7 FL (ref 6–12)
RBC # BLD AUTO: 3.96 10*6/MM3 (ref 3.77–5.28)
WBC NRBC COR # BLD AUTO: 15.13 10*3/MM3 (ref 3.4–10.8)

## 2025-01-22 PROCEDURE — 85025 COMPLETE CBC W/AUTO DIFF WBC: CPT | Performed by: OBSTETRICS & GYNECOLOGY

## 2025-01-22 PROCEDURE — 25010000002 KETOROLAC TROMETHAMINE PER 15 MG: Performed by: OBSTETRICS & GYNECOLOGY

## 2025-01-22 PROCEDURE — 0503F POSTPARTUM CARE VISIT: CPT | Performed by: NURSE PRACTITIONER

## 2025-01-22 PROCEDURE — 25010000002 ENOXAPARIN PER 10 MG: Performed by: OBSTETRICS & GYNECOLOGY

## 2025-01-22 RX ADMIN — KETOROLAC TROMETHAMINE 15 MG: 15 INJECTION, SOLUTION INTRAMUSCULAR; INTRAVENOUS at 10:14

## 2025-01-22 RX ADMIN — KETOROLAC TROMETHAMINE 15 MG: 15 INJECTION, SOLUTION INTRAMUSCULAR; INTRAVENOUS at 04:59

## 2025-01-22 RX ADMIN — PRENATAL VITAMINS-IRON FUMARATE 27 MG IRON-FOLIC ACID 0.8 MG TABLET 1 TABLET: at 08:29

## 2025-01-22 RX ADMIN — ACETAMINOPHEN 650 MG: 325 TABLET ORAL at 19:11

## 2025-01-22 RX ADMIN — IBUPROFEN 600 MG: 600 TABLET, FILM COATED ORAL at 22:36

## 2025-01-22 RX ADMIN — ACETAMINOPHEN 1000 MG: 500 TABLET ORAL at 00:55

## 2025-01-22 RX ADMIN — SIMETHICONE 80 MG: 80 TABLET, CHEWABLE ORAL at 08:29

## 2025-01-22 RX ADMIN — ACETAMINOPHEN 650 MG: 325 TABLET ORAL at 13:32

## 2025-01-22 RX ADMIN — SIMETHICONE 80 MG: 80 TABLET, CHEWABLE ORAL at 22:36

## 2025-01-22 RX ADMIN — IBUPROFEN 600 MG: 600 TABLET, FILM COATED ORAL at 16:12

## 2025-01-22 RX ADMIN — ACETAMINOPHEN 1000 MG: 500 TABLET ORAL at 06:36

## 2025-01-22 RX ADMIN — DOCUSATE SODIUM 100 MG: 100 CAPSULE, LIQUID FILLED ORAL at 22:36

## 2025-01-22 RX ADMIN — DOCUSATE SODIUM 100 MG: 100 CAPSULE, LIQUID FILLED ORAL at 08:29

## 2025-01-22 RX ADMIN — ENOXAPARIN SODIUM 40 MG: 100 INJECTION SUBCUTANEOUS at 10:14

## 2025-01-22 NOTE — PROGRESS NOTES
Postpartum Progress Note    Patient name: Hannah Collett  YOB: 2005   MRN: [id number]  Referring Provider: No Known Provider  Admission Date: 2025  Date of Service: 2025    ID: 19 y.o.     Diagnosis:   S/p  delivery 1 Day Post-Op     Pregnancy       Subjective:      No complaints.  Moderate lochia.  Ambulating, voiding, tolerating diet.  Pain well controlled.  It is unknown whether the patient is currently breastfeeding.   This baby is a female    Objective:      Vital signs:  Vital Signs Range for the last 24 hours  Temperature: Temp:  [97.4 °F (36.3 °C)-99.2 °F (37.3 °C)] 98.6 °F (37 °C)   Temp Source: Temp src: Oral   BP: BP: (105-131)/(58-78) 125/68   Pulse: Heart Rate:  [58-83] 65   Respirations: Resp:  [16-18] 16   Weight: 106 kg (233 lb)     General: Alert & oriented x4, in no apparent distress  Abdomen: soft, nontender  Uterus: firm, nontender  Incision: clean, dry, intact  Extremities: nontender; no edema      Labs:  Lab Results   Component Value Date    WBC 15.13 (H) 2025    HGB 11.8 (L) 2025    HCT 36.6 2025    MCV 92.4 2025     2025     Results from last 7 days   Lab Units 25  1007   ABO TYPING  O   RH TYPING  Positive     External Prenatal Results       Pregnancy Outside Results - Transcribed From Office Records - See Scanned Records For Details       Test Value Date Time    ABO  O  25 1007    Rh  Positive  25 1007    Antibody Screen  Negative  25 0609       Negative  10/29/24 1120       Negative  24 1044    Varicella IgG  239 index 24 1044    Rubella  1.14 index 24 1044    Hgb  11.8 g/dL 25 0316       13.3 g/dL 25 0609      ^ 13 g/dL 24 0439      ^ 14.1 g/dL 24 1246       12.3 g/dL 10/29/24 1120       13.8 g/dL 24 1044      ^ 14.4 g/dL 24 1750      ^ 14.2 g/dL 24    Hct  36.6 % 25 0316       39.2 % 25 0609      ^ 38.3 %  24 0439      ^ 40.8 % 24 1246       37.5 % 10/29/24 1120       42.3 % 24 1044      ^ 41.6 % 24 1750      ^ 41.2 % 24    HgB A1c   5.0 % 24 1044    1h GTT  109 mg/dL 10/29/24 1120    3h GTT Fasting       3h GTT 1 hour       3h GTT 2 hour       3h GTT 3 hour        Gonorrhea (discrete)  Negative  24 1044    Chlamydia (discrete)  Negative  24 1044    RPR ^ Nonreactive  24 1409       Non Reactive  10/29/24 1120       Non Reactive  24 1044    Syphils cascade: TP-Ab (FTA)  Non-Reactive  25 0609    TP-Ab  Non-Reactive  25 0609    TP-Ab (EIA)       TPPA       HBsAg  Negative  24 1044    Herpes Simplex Virus PCR       Herpes Simplex VIrus Culture       HIV  Non Reactive  24 1044    Hep C RNA Quant PCR       Hep C Antibody  Non Reactive  24 1044    AFP       NIPT       Cystic Fibrosis (Beena)       Cystic Fibroisis        Spinal Muscular atrophy       Fragile X       Group B Strep  No Group B Streptococcus isolated  24 1749    GBS Susceptibility to Clindamycin       GBS Susceptibility to Erythromycin       Fetal Fibronectin       Genetic Testing, Maternal Blood                 Drug Screening       Test Value Date Time    Urine Drug Screen       Amphetamine Screen  Negative ng/mL 24 1044    Barbiturate Screen  Negative ng/mL 24 1044    Benzodiazepine Screen  Negative ng/mL 24 1044    Methadone Screen  Negative ng/mL 24 1044    Phencyclidine Screen  Negative ng/mL 24 1044    Opiates Screen       THC Screen       Cocaine Screen       Propoxyphene Screen  Negative ng/mL 24 1044    Buprenorphine Screen       Methamphetamine Screen       Oxycodone Screen       Tricyclic Antidepressants Screen                 Legend    ^: Historical                            Assessment/Plan:      1 Day Post-Op s/p Procedure(s):   SECTION REPEAT  1. S/p  delivery: Continue postoperative care.   Doing well.  2. Infant feeding: Supportive care.  It is unknown whether the patient is currently breastfeeding.

## 2025-01-22 NOTE — OUTREACH NOTE
[facility]  IP Postpartum    Questions/Answers      Flowsheet Row Responses   Best Method for Contacting Cell   Support Person Present No   Does the patient have a car seat at the hospital Yes   Delivery Note Reviewed Reviewed   Were birth expectations met? Yes   Is there a need for additional support/resources? No   Lactation Note Reviewed --  [Formula feeding]   Is additional support needed? No   Any questions or concerns? No   Any concerns related discharge meds/ability to  prescriptions? No   Confirm Postpartum OB appointment No   Confirm initial well-child Pediatrician appointment date/time: No  [Plans f/u with [facility]]   Additional post-discharge F/U appointments No   Does patient have transportation to appointments? Yes   Any other assistance needed to ensure she is able to attend appointments? No   Does patient have supplies needed at home for  care? Clothing, Crib, Diapers, Formula          Feeling well after delivery. Signs and symptoms of preeclampsia reviewed. VU. Provided with POST Birth warning signs flyer from [employer]. Encouraged to call with questions, concerns, or for support. Contact information provided. Will f/u 25.    Ada Ramírez RN  Maternity Nurse Navigator    2025, 15:25 EST

## 2025-01-22 NOTE — ANESTHESIA POSTPROCEDURE EVALUATION
Patient: Hannah Collett    Procedure Summary       Date: 25 Room / Location: [facility] LABOR DELIVERY   [facility] LABOR DELIVERY    Anesthesia Start: 738 Anesthesia Stop: 832    Procedure:  SECTION REPEAT (Abdomen) Diagnosis:     Surgeons: Hollis Henning MD Provider: Lien De La Rosa DO    Anesthesia Type: ITN, spinal ASA Status: 3            Anesthesia Type: ITN, spinal    Vitals  Vitals Value Taken Time   /78 25 1140   Temp 98 °F (36.7 °C) 25 1140   Pulse 66 25 1140   Resp 18 25 1140   SpO2 99 % 25 1040   Vitals shown include unfiled device data.        Post Anesthesia Care and Evaluation    Patient location during evaluation: bedside  Patient participation: complete - patient participated  Level of consciousness: awake and alert  Pain management: adequate    Airway patency: patent  Anesthetic complications: No anesthetic complications    Cardiovascular status: acceptable  Respiratory status: acceptable  Hydration status: acceptable  Post Neuraxial Block status: Motor and sensory function returned to baseline and No signs or symptoms of PDPH

## 2025-01-23 VITALS
OXYGEN SATURATION: 99 % | BODY MASS INDEX: 39.78 KG/M2 | SYSTOLIC BLOOD PRESSURE: 126 MMHG | RESPIRATION RATE: 16 BRPM | DIASTOLIC BLOOD PRESSURE: 77 MMHG | HEART RATE: 84 BPM | HEIGHT: 64 IN | TEMPERATURE: 97.7 F | WEIGHT: 233 LBS

## 2025-01-23 PROBLEM — Z34.90 PREGNANCY: Status: RESOLVED | Noted: 2022-06-29 | Resolved: 2025-01-23

## 2025-01-23 RX ORDER — PSEUDOEPHEDRINE HCL 30 MG
100 TABLET ORAL 2 TIMES DAILY PRN
Qty: 60 CAPSULE | Refills: 0 | Status: SHIPPED | OUTPATIENT
Start: 2025-01-23

## 2025-01-23 RX ORDER — IBUPROFEN 600 MG/1
600 TABLET, FILM COATED ORAL EVERY 6 HOURS PRN
Qty: 60 TABLET | Refills: 0 | Status: SHIPPED | OUTPATIENT
Start: 2025-01-23

## 2025-01-23 RX ADMIN — IBUPROFEN 600 MG: 600 TABLET, FILM COATED ORAL at 04:03

## 2025-01-23 RX ADMIN — PRENATAL VITAMINS-IRON FUMARATE 27 MG IRON-FOLIC ACID 0.8 MG TABLET 1 TABLET: at 09:51

## 2025-01-23 RX ADMIN — IBUPROFEN 600 MG: 600 TABLET, FILM COATED ORAL at 09:51

## 2025-01-23 RX ADMIN — ACETAMINOPHEN 650 MG: 325 TABLET ORAL at 01:01

## 2025-01-23 RX ADMIN — DOCUSATE SODIUM 100 MG: 100 CAPSULE, LIQUID FILLED ORAL at 09:51

## 2025-01-23 RX ADMIN — ACETAMINOPHEN 650 MG: 325 TABLET ORAL at 07:47

## 2025-01-23 NOTE — PLAN OF CARE
Goal Outcome Evaluation:  Plan of Care Reviewed With: patient                   Ready for d/c

## 2025-01-23 NOTE — DISCHARGE SUMMARY
Discharge Summary    Date of Admission: 2025  Date of Discharge:  2025      Patient: Hannah Collett      MR#:[id number]    Primary Surgeon/OB: Hollis Henning MD    Discharge Surgeon/OB: /Eduar REAVES    Presenting Problem/History of Present Illness  Pregnancy [Z34.90]     Patient Active Problem List    Diagnosis     Cold sore [B00.1]     Genital herpes [A60.00]     Smoker [F17.200]     Intrauterine pregnancy in teenager [Z34.80]     Fever blister [B00.1]     Previous  section [Z98.891]     Supervision of other normal pregnancy, antepartum [Z34.80]          Discharge Diagnosis:  section at 39w1d    Procedures:  , Low Transverse    2025   8:01 AM           Discharge Date: 2025; Discharge Time: 09:56 EST        Hospital Course  Patient is a 19 y.o. female  at 39w1d status post  section with uneventful postoperative recovery.  Patient was advanced to regular diet on postoperative day#1.  On discharge, ambulating, tolerating a regular diet without any difficulties and her incision is dry, clean and intact.     Infant:   female fetus 3214 g (7 lb 1.4 oz) with Apgar scores of 8 , 9  at five minutes.    Condition on Discharge:  Stable    Vital Signs  Temp:  [97.7 °F (36.5 °C)-98.6 °F (37 °C)] 97.7 °F (36.5 °C)  Heart Rate:  [65-84] 84  Resp:  [16-18] 16  BP: (118-135)/(63-78) 126/77    Lab Results   Component Value Date    WBC 15.13 (H) 2025    HGB 11.8 (L) 2025    HCT 36.6 2025    MCV 92.4 2025     2025       Discharge Disposition  Home or Self Care    Discharge Medications     Discharge Medications        New Medications        Instructions Start Date   docusate sodium 100 MG capsule   100 mg, Oral, 2 Times Daily PRN      ibuprofen 600 MG tablet  Commonly known as: ADVIL,MOTRIN   600 mg, Oral, Every 6 Hours PRN             Continue These Medications        Instructions Start Date   prenatal vitamin 28-0.8 28-0.8  MG tablet tablet   Daily      valACYclovir 1000 MG tablet  Commonly known as: Valtrex   1,000 mg, Oral, Daily      valACYclovir 1000 MG tablet  Commonly known as: Valtrex   1,000 mg, Oral, 2 Times Daily                   Follow-up Appointments  No future appointments.  Additional Instructions for the Follow-ups that You Need to Schedule       Call MD With Problems / Concerns   As directed      Instructions: Monitor for excessive bleeding >1 pad/hr for several hours, dizziness, syncope, shortness of breath, chest pain, fever or changes in blood pressure. Call or go to ED with foul smelling discharge, fever of >100.4, signs of postpartum depression, saturating a pad in <1 hour, blood clots larger than a golf ball. Also, call with headache that does not resolve with medication or rest, vision changes, pain in right upper quadrant, worsening swelling, or BP >140/90 if able to monitor at home.    Order Comments: Instructions: Monitor for excessive bleeding >1 pad/hr for several hours, dizziness, syncope, shortness of breath, chest pain, fever or changes in blood pressure. Call or go to ED with foul smelling discharge, fever of >100.4, signs of postpartum depression, saturating a pad in <1 hour, blood clots larger than a golf ball. Also, call with headache that does not resolve with medication or rest, vision changes, pain in right upper quadrant, worsening swelling, or BP >140/90 if able to monitor at home.         Discharge Follow-up with Specified Provider:  or JELLY; 2 Weeks   As directed      To:  or JELLY   Follow Up: 2 Weeks                JELLY Pugh  01/23/25  09:55 EST  Csd    Discharge Medications     Discharge Medications        New Medications        Instructions Start Date   docusate sodium 100 MG capsule   100 mg, Oral, 2 Times Daily PRN      ibuprofen 600 MG tablet  Commonly known as: ADVIL,MOTRIN   600 mg, Oral, Every 6 Hours PRN             Continue These Medications         Instructions Start Date   prenatal vitamin 28-0.8 28-0.8 MG tablet tablet   Daily      valACYclovir 1000 MG tablet  Commonly known as: Valtrex   1,000 mg, Oral, Daily      valACYclovir 1000 MG tablet  Commonly known as: Valtrex   1,000 mg, Oral, 2 Times Daily               Discharge Diet:   Diet Instructions       Diet: Regular/House Diet; Thin (IDDSI 0)      Discharge Diet: Regular/House Diet    Fluid Consistency: Thin (IDDSI 0)            Activity at Discharge:   Activity Instructions       Driving Restrictions      Type of Restriction:  Driving  Lifting  Bathing  Sex       Driving Restrictions: No Driving (Time Limited)    Length: 2 Weeks    Explain Sexual Activity Restrictions: 6 weeks    Bathing Restrictions: No Tub Bath    Lifting Restrictions: Lifting Restriction (Indicate Limit)    Weight Limit (Pounds): 20    Length of Lifting Restriction: 2 weeks    Pelvic Rest              Follow-up Appointments  No future appointments.  Additional Instructions for the Follow-ups that You Need to Schedule       Call MD With Problems / Concerns   As directed      Instructions: Monitor for excessive bleeding >1 pad/hr for several hours, dizziness, syncope, shortness of breath, chest pain, fever or changes in blood pressure. Call or go to ED with foul smelling discharge, fever of >100.4, signs of postpartum depression, saturating a pad in <1 hour, blood clots larger than a golf ball. Also, call with headache that does not resolve with medication or rest, vision changes, pain in right upper quadrant, worsening swelling, or BP >140/90 if able to monitor at home.    Order Comments: Instructions: Monitor for excessive bleeding >1 pad/hr for several hours, dizziness, syncope, shortness of breath, chest pain, fever or changes in blood pressure. Call or go to ED with foul smelling discharge, fever of >100.4, signs of postpartum depression, saturating a pad in <1 hour, blood clots larger than a golf ball. Also, call with headache  that does not resolve with medication or rest, vision changes, pain in right upper quadrant, worsening swelling, or BP >140/90 if able to monitor at home.         Discharge Follow-up with Specified Provider:  or JELLY; 2 Weeks   As directed      To:  or JELLY   Follow Up: 2 Weeks                JELLY Pugh  01/23/25  09:55 EST

## 2025-01-31 ENCOUNTER — PATIENT OUTREACH (OUTPATIENT)
Dept: LABOR AND DELIVERY | Facility: HOSPITAL | Age: 20
End: 2025-01-31
Payer: COMMERCIAL

## 2025-01-31 NOTE — OUTREACH NOTE
[facility]  Unable to Reach    Questions/Answers      Flowsheet Row Responses   Pending Outreach Postpartum Check-in   Call Attempt First   Outcome Not available   Next Call Attempt Date 02/03/25   Unable to reach comments: Boyfriend has phone. States he is not at home and she doesn't have another number. Requests call back on Monday morning.              CHARLIE Ramírez RN  Maternity Nurse Navigator    1/31/2025, 13:37 EST

## 2025-02-03 ENCOUNTER — PATIENT OUTREACH (OUTPATIENT)
Dept: LABOR AND DELIVERY | Facility: HOSPITAL | Age: 20
End: 2025-02-03
Payer: COMMERCIAL

## 2025-02-03 NOTE — OUTREACH NOTE
[facility]  Unable to Reach    Questions/Answers      Flowsheet Row Responses   Pending Outreach Postpartum Check-in   Call Attempt Second   Outcome No answer/busy   Next Call Attempt Date 02/03/25   Unable to reach comments: Will try later today            No voicemail option.    CHARLIE Ramírez RN  Maternity Nurse Navigator    2/3/2025, 09:26 EST

## 2025-02-03 NOTE — OUTREACH NOTE
[facility]  Unable to Reach    Questions/Answers      Flowsheet Row Responses   Pending Outreach Postpartum Check-in   Call Attempt Third   Outcome No answer/busy, Left message, [employer] message sent to patient            Unable to reach x3 attempts. Updated resources sent via [employer] message.    CHARLIE Ramírez RN  Maternity Nurse Navigator    2/3/2025, 17:21 EST

## 2025-02-10 ENCOUNTER — PATIENT OUTREACH (OUTPATIENT)
Dept: CALL CENTER | Facility: HOSPITAL | Age: 20
End: 2025-02-10
Payer: COMMERCIAL

## 2025-02-10 NOTE — OUTREACH NOTE
Motherhood Connection Survey      Flowsheet Row Responses   [facility] facility patient discharged from? [place]   Week 1 attempt successful? No   Unsuccessful attempts Attempt 1   Reschedule Today              Juanita CHARLES - Registered Nurse

## 2025-02-10 NOTE — OUTREACH NOTE
Motherhood Connection Survey      Flowsheet Row Responses   [facility] facility patient discharged from? [place]   Week 1 attempt successful? No   Unsuccessful attempts Attempt 2   Reschedule Tomorrow              Juanita CHARLES - Registered Nurse

## 2025-02-11 ENCOUNTER — PATIENT OUTREACH (OUTPATIENT)
Dept: CALL CENTER | Facility: HOSPITAL | Age: 20
End: 2025-02-11
Payer: COMMERCIAL

## 2025-02-11 NOTE — OUTREACH NOTE
Motherhood Connection Survey      Flowsheet Row Responses   [facility] patient discharged from? [place]   Week 1 attempt successful? Yes   Call start time 1525   Call end time 152   Baby sex Girl   [place] discharged home with mother? Yes   Baby sex Girl   Delivery type    Emotional state Acceptance   Family support Yes   Do you have all necessary resources to care for you and your baby?  Yes   Have members of your household adjusted to your baby? Yes   Did you have any problems with pre-eclampsia during this pregnancy? No   Did you have blood glucose issues during this pregnancy No   Lochia amount Light   Lochia per patient report Rubra   Did you have an episiotomy/tear/abdominal incision? Yes   Feeding Method Bottle   Frequency q 3hrs   Amount 4 oz   Number of wet diapers x 24 hours 8-9   Last BM x 24 hours 5-6   Umbilical Cord No reported signs or symptoms   Where does the baby usually sleep? Bassinet   Are there stuffed animals, toys, pillows, quilts, blankets, wedges, positioners, bumpers or other loose bedding in the infant's sleeping environment? No   Does the baby ever share a sleep surface in a bed, couch, recliner or other? No   What position do you lay your baby down to sleep? Back   Are you and/or other caregivers smoking inside or outside the baby's home? No   Mom appointment comments: pp f/u needs to be scheduled, pt instructed   Baby appointment comments: peds visits done   Call completed? Yes   How satisfied were you with the Mother[facility] Connection Program? 5              Juanita CHARLES - Registered Nurse

## (undated) DEVICE — 4-PORT MANIFOLD: Brand: NEPTUNE 2

## (undated) DEVICE — SOL IRR NACL 0.9PCT BO 1000ML

## (undated) DEVICE — PENCL SMOKE/EVAC MEGADYNE TELESCP 10FT

## (undated) DEVICE — MAT PREVALON MOBL TRANSFR AIR W/PAD REPROC 39X81IN

## (undated) DEVICE — SUT MNCRYL 3/0 27L Y523H BX/36

## (undated) DEVICE — SOL IRR H2O BO 1000ML STRL

## (undated) DEVICE — VIOLET BRAIDED (POLYGLACTIN 910), SYNTHETIC ABSORBABLE SUTURE: Brand: COATED VICRYL

## (undated) DEVICE — SUT VIC 3/0 CT1 27IN DYED J338H

## (undated) DEVICE — SUT PDS 1 TP1 48IN Z880G BX/12

## (undated) DEVICE — SUT MNCRYL 1/0 CT1 36IN Y947H BX/36

## (undated) DEVICE — APPL CHLORAPREP TINTED 26ML TEAL

## (undated) DEVICE — TRY SPINE BLCK WHITACRE 25G 3X5IN

## (undated) DEVICE — CLTH CLENS READYCLEANSE PERI CARE PK/5

## (undated) DEVICE — ADHS SKIN PREMIERPRO EXOFIN TOPICAL HI/VISC .5ML

## (undated) DEVICE — GLV SURG PREMIERPRO GAMMEX NEOPRN PF SZ7 GRN

## (undated) DEVICE — PATIENT RETURN ELECTRODE, SINGLE-USE, CONTACT QUALITY MONITORING, ADULT, WITH 9FT CORD, FOR PATIENTS WEIGING OVER 33LBS. (15KG): Brand: MEGADYNE

## (undated) DEVICE — PK C/SECT 10

## (undated) DEVICE — TRAP FLD MINIVAC MEGADYNE 100ML

## (undated) DEVICE — SUT VIC 2/0 CT1 27IN J339H BX/36